# Patient Record
Sex: MALE | Race: BLACK OR AFRICAN AMERICAN | NOT HISPANIC OR LATINO
[De-identification: names, ages, dates, MRNs, and addresses within clinical notes are randomized per-mention and may not be internally consistent; named-entity substitution may affect disease eponyms.]

---

## 2020-10-30 ENCOUNTER — NON-APPOINTMENT (OUTPATIENT)
Age: 75
End: 2020-10-30

## 2020-10-30 ENCOUNTER — APPOINTMENT (OUTPATIENT)
Dept: CARDIOLOGY | Facility: CLINIC | Age: 75
End: 2020-10-30
Payer: SELF-PAY

## 2020-10-30 VITALS
TEMPERATURE: 98.2 F | BODY MASS INDEX: 20.82 KG/M2 | HEIGHT: 68 IN | OXYGEN SATURATION: 98 % | WEIGHT: 137.38 LBS | RESPIRATION RATE: 17 BRPM | SYSTOLIC BLOOD PRESSURE: 170 MMHG | HEART RATE: 72 BPM | DIASTOLIC BLOOD PRESSURE: 100 MMHG

## 2020-10-30 DIAGNOSIS — Z00.00 ENCOUNTER FOR GENERAL ADULT MEDICAL EXAMINATION W/OUT ABNORMAL FINDINGS: ICD-10-CM

## 2020-10-30 DIAGNOSIS — Z71.89 OTHER SPECIFIED COUNSELING: ICD-10-CM

## 2020-10-30 PROCEDURE — 93000 ELECTROCARDIOGRAM COMPLETE: CPT

## 2020-10-30 PROCEDURE — 99204 OFFICE O/P NEW MOD 45 MIN: CPT

## 2020-10-30 RX ORDER — ASPIRIN ENTERIC COATED TABLETS 81 MG 81 MG/1
81 TABLET, DELAYED RELEASE ORAL
Qty: 30 | Refills: 0 | Status: ACTIVE | COMMUNITY
Start: 2020-10-30 | End: 1900-01-01

## 2020-10-30 NOTE — HISTORY OF PRESENT ILLNESS
[FreeTextEntry1] : Here to establish primary care and for evaluation of symptoms.  [de-identified] : Wu is a 74yo male with PMH of HTN who presents today to establish care and for evaluation of symptoms. Patient is accompanied by his daughter Carola who states that her father sustained a puncture wound to his left lower leg  last Saturday for which they went to urgent care, and urgent care sent them to the hospital, he received seven stitches , and it was noted the patient has uncontrolled BP while at the hospital BP = 216/133, then at home Sunday 190/120, then last night at home 176/100. daughter states since the incident the patient has been noticeably fatigued, with slower speech, daughter denies loss of strength or asymmetry in face. Patient was recommended to go to the ED for evaluation but refused. Patient's daughter reports that the fatigue and slowed speech have been since Saturday when he sustained the puncture wound.

## 2020-10-30 NOTE — PHYSICAL EXAM
[No Acute Distress] : no acute distress [Well Nourished] : well nourished [Well Developed] : well developed [Well-Appearing] : well-appearing [Normal Sclera/Conjunctiva] : normal sclera/conjunctiva [PERRL] : pupils equal round and reactive to light [EOMI] : extraocular movements intact [Normal Outer Ear/Nose] : the outer ears and nose were normal in appearance [Normal Oropharynx] : the oropharynx was normal [No JVD] : no jugular venous distention [No Lymphadenopathy] : no lymphadenopathy [Supple] : supple [Thyroid Normal, No Nodules] : the thyroid was normal and there were no nodules present [No Respiratory Distress] : no respiratory distress  [No Accessory Muscle Use] : no accessory muscle use [Clear to Auscultation] : lungs were clear to auscultation bilaterally [Normal Rate] : normal rate  [Regular Rhythm] : with a regular rhythm [Normal S1, S2] : normal S1 and S2 [No Murmur] : no murmur heard [No Carotid Bruits] : no carotid bruits [No Abdominal Bruit] : a ~M bruit was not heard ~T in the abdomen [No Varicosities] : no varicosities [Pedal Pulses Present] : the pedal pulses are present [No Edema] : there was no peripheral edema [No Palpable Aorta] : no palpable aorta [No Extremity Clubbing/Cyanosis] : no extremity clubbing/cyanosis [Soft] : abdomen soft [Non Tender] : non-tender [Non-distended] : non-distended [No Masses] : no abdominal mass palpated [No HSM] : no HSM [Normal Bowel Sounds] : normal bowel sounds [Normal Posterior Cervical Nodes] : no posterior cervical lymphadenopathy [Normal Anterior Cervical Nodes] : no anterior cervical lymphadenopathy [No CVA Tenderness] : no CVA  tenderness [No Spinal Tenderness] : no spinal tenderness [No Joint Swelling] : no joint swelling [Grossly Normal Strength/Tone] : grossly normal strength/tone [No Rash] : no rash [Coordination Grossly Intact] : coordination grossly intact [No Focal Deficits] : no focal deficits [Normal Gait] : normal gait [Normal Affect] : the affect was normal [Normal Insight/Judgement] : insight and judgment were intact [de-identified] : left leg wound noted and intact  [de-identified] : alert and oriented to person /place /time

## 2020-10-31 ENCOUNTER — NON-APPOINTMENT (OUTPATIENT)
Age: 75
End: 2020-10-31

## 2020-11-30 ENCOUNTER — APPOINTMENT (OUTPATIENT)
Dept: CARDIOLOGY | Facility: CLINIC | Age: 75
End: 2020-11-30

## 2021-08-12 LAB
25(OH)D3 SERPL-MCNC: 19.8 NG/ML
ALBUMIN SERPL ELPH-MCNC: 4 G/DL
ALP BLD-CCNC: 122 U/L
ALT SERPL-CCNC: 10 U/L
ANION GAP SERPL CALC-SCNC: 13 MMOL/L
APPEARANCE: CLEAR
AST SERPL-CCNC: 25 U/L
BACTERIA UR CULT: NORMAL
BACTERIA: NEGATIVE
BASOPHILS # BLD AUTO: 0.06 K/UL
BASOPHILS NFR BLD AUTO: 0.6 %
BILIRUB SERPL-MCNC: 0.5 MG/DL
BILIRUBIN URINE: NEGATIVE
BLOOD URINE: NEGATIVE
BUN SERPL-MCNC: 8 MG/DL
CALCIUM SERPL-MCNC: 9 MG/DL
CHLORIDE SERPL-SCNC: 103 MMOL/L
CHOLEST SERPL-MCNC: 180 MG/DL
CO2 SERPL-SCNC: 23 MMOL/L
COLOR: NORMAL
CREAT SERPL-MCNC: 0.94 MG/DL
EOSINOPHIL # BLD AUTO: 0.29 K/UL
EOSINOPHIL NFR BLD AUTO: 3.1 %
ESTIMATED AVERAGE GLUCOSE: 103 MG/DL
FERRITIN SERPL-MCNC: 253 NG/ML
FOLATE SERPL-MCNC: 15.6 NG/ML
GLUCOSE QUALITATIVE U: NEGATIVE
GLUCOSE SERPL-MCNC: 88 MG/DL
HBA1C MFR BLD HPLC: 5.2 %
HCT VFR BLD CALC: 44.4 %
HDLC SERPL-MCNC: 74 MG/DL
HGB BLD-MCNC: 13.9 G/DL
HYALINE CASTS: 0 /LPF
IMM GRANULOCYTES NFR BLD AUTO: 0.3 %
IRON SATN MFR SERPL: 14 %
IRON SERPL-MCNC: 35 UG/DL
KETONES URINE: NEGATIVE
LDLC SERPL CALC-MCNC: 91 MG/DL
LEUKOCYTE ESTERASE URINE: NEGATIVE
LYMPHOCYTES # BLD AUTO: 1.59 K/UL
LYMPHOCYTES NFR BLD AUTO: 17.1 %
MAN DIFF?: NORMAL
MCHC RBC-ENTMCNC: 30.3 PG
MCHC RBC-ENTMCNC: 31.3 GM/DL
MCV RBC AUTO: 96.9 FL
MICROSCOPIC-UA: NORMAL
MONOCYTES # BLD AUTO: 1.11 K/UL
MONOCYTES NFR BLD AUTO: 11.9 %
NEUTROPHILS # BLD AUTO: 6.21 K/UL
NEUTROPHILS NFR BLD AUTO: 67 %
NITRITE URINE: NEGATIVE
NONHDLC SERPL-MCNC: 106 MG/DL
PH URINE: 7
PLATELET # BLD AUTO: 196 K/UL
POTASSIUM SERPL-SCNC: 3.6 MMOL/L
PROT SERPL-MCNC: 7.8 G/DL
PROTEIN URINE: NEGATIVE
PSA SERPL-MCNC: 24.2 NG/ML
RBC # BLD: 4.58 M/UL
RBC # FLD: 13.5 %
RED BLOOD CELLS URINE: 1 /HPF
SODIUM SERPL-SCNC: 139 MMOL/L
SPECIFIC GRAVITY URINE: 1.01
SQUAMOUS EPITHELIAL CELLS: 0 /HPF
T4 FREE SERPL-MCNC: 1.1 NG/DL
TIBC SERPL-MCNC: 252 UG/DL
TRIGL SERPL-MCNC: 76 MG/DL
TSH SERPL-ACNC: 0.63 UIU/ML
UIBC SERPL-MCNC: 217 UG/DL
UROBILINOGEN URINE: NORMAL
VIT B12 SERPL-MCNC: 201 PG/ML
WBC # FLD AUTO: 9.29 K/UL
WHITE BLOOD CELLS URINE: 0 /HPF

## 2021-08-19 ENCOUNTER — LABORATORY RESULT (OUTPATIENT)
Age: 76
End: 2021-08-19

## 2021-08-19 ENCOUNTER — NON-APPOINTMENT (OUTPATIENT)
Age: 76
End: 2021-08-19

## 2021-08-19 ENCOUNTER — APPOINTMENT (OUTPATIENT)
Dept: CARDIOLOGY | Facility: CLINIC | Age: 76
End: 2021-08-19
Payer: MEDICARE

## 2021-08-19 VITALS
HEART RATE: 84 BPM | DIASTOLIC BLOOD PRESSURE: 90 MMHG | BODY MASS INDEX: 19.31 KG/M2 | OXYGEN SATURATION: 99 % | WEIGHT: 127 LBS | SYSTOLIC BLOOD PRESSURE: 140 MMHG | TEMPERATURE: 97.5 F

## 2021-08-19 DIAGNOSIS — T14.8XXA OTHER INJURY OF UNSPECIFIED BODY REGION, INITIAL ENCOUNTER: ICD-10-CM

## 2021-08-19 PROCEDURE — 99214 OFFICE O/P EST MOD 30 MIN: CPT

## 2021-08-19 PROCEDURE — 93000 ELECTROCARDIOGRAM COMPLETE: CPT

## 2021-08-19 NOTE — HISTORY OF PRESENT ILLNESS
[FreeTextEntry1] : This is a 76 year old gentlemen with a PMH of HTN presents today for follow up after being seen in our office on October 30, 2020. Patient did not see a Neurologist and did not have neuro workup. Patient states that he is no longer having those issues of speech difficulty and repeating things. Patient is A+OX3.   Patient is accompanied by his daughter Zandra. Patient states that the wound of the skin is resolved. Patient states that he has been taking the Norvasc 5 mg daily as recommended. Patient denies dyspnea, palpitations, chest pain, nausea, vomiting, dizziness and lightheadedness.pt with increased psa did not see urology \par

## 2021-08-20 ENCOUNTER — NON-APPOINTMENT (OUTPATIENT)
Age: 76
End: 2021-08-20

## 2021-08-20 LAB — PSA SERPL-MCNC: 33.4 NG/ML

## 2021-08-21 ENCOUNTER — APPOINTMENT (OUTPATIENT)
Dept: CARDIOLOGY | Facility: CLINIC | Age: 76
End: 2021-08-21
Payer: MEDICARE

## 2021-08-21 PROCEDURE — 93306 TTE W/DOPPLER COMPLETE: CPT

## 2021-08-24 ENCOUNTER — NON-APPOINTMENT (OUTPATIENT)
Age: 76
End: 2021-08-24

## 2021-08-24 LAB
25(OH)D3 SERPL-MCNC: 16.9 NG/ML
ALBUMIN SERPL ELPH-MCNC: 4.3 G/DL
ALP BLD-CCNC: 114 U/L
ALT SERPL-CCNC: 14 U/L
ANION GAP SERPL CALC-SCNC: 10 MMOL/L
AST SERPL-CCNC: 22 U/L
BASOPHILS # BLD AUTO: 0.06 K/UL
BASOPHILS NFR BLD AUTO: 0.7 %
BILIRUB SERPL-MCNC: 0.3 MG/DL
BUN SERPL-MCNC: 11 MG/DL
CALCIUM SERPL-MCNC: 9.4 MG/DL
CHLORIDE SERPL-SCNC: 105 MMOL/L
CHOLEST SERPL-MCNC: 161 MG/DL
CO2 SERPL-SCNC: 27 MMOL/L
CREAT SERPL-MCNC: 1.01 MG/DL
EOSINOPHIL # BLD AUTO: 0.07 K/UL
EOSINOPHIL NFR BLD AUTO: 0.9 %
FERRITIN SERPL-MCNC: 198 NG/ML
FOLATE SERPL-MCNC: 15.1 NG/ML
GLUCOSE SERPL-MCNC: 83 MG/DL
HAPTOGLOB SERPL-MCNC: 155 MG/DL
HCT VFR BLD CALC: 44.9 %
HDLC SERPL-MCNC: 95 MG/DL
HGB BLD-MCNC: 14.7 G/DL
IMM GRANULOCYTES NFR BLD AUTO: 0.2 %
IRON SERPL-MCNC: 101 UG/DL
LDH SERPL-CCNC: 103 U/L
LDLC SERPL CALC-MCNC: 56 MG/DL
LYMPHOCYTES # BLD AUTO: 1.48 K/UL
LYMPHOCYTES NFR BLD AUTO: 18.3 %
MAGNESIUM SERPL-MCNC: 2.2 MG/DL
MAN DIFF?: NORMAL
MCHC RBC-ENTMCNC: 30.9 PG
MCHC RBC-ENTMCNC: 32.7 GM/DL
MCV RBC AUTO: 94.5 FL
MONOCYTES # BLD AUTO: 0.74 K/UL
MONOCYTES NFR BLD AUTO: 9.2 %
NEUTROPHILS # BLD AUTO: 5.7 K/UL
NEUTROPHILS NFR BLD AUTO: 70.7 %
NONHDLC SERPL-MCNC: 66 MG/DL
PHOSPHATE SERPL-MCNC: 2.9 MG/DL
PLATELET # BLD AUTO: 214 K/UL
POTASSIUM SERPL-SCNC: 4.8 MMOL/L
PROT SERPL-MCNC: 7.1 G/DL
RBC # BLD: 4.75 M/UL
RBC # FLD: 14 %
SODIUM SERPL-SCNC: 142 MMOL/L
T3RU NFR SERPL: 1 TBI
T4 FREE SERPL-MCNC: 1.2 NG/DL
T4 SERPL-MCNC: 5.4 UG/DL
TRANSFERRIN SERPL-MCNC: 206 MG/DL
TRIGL SERPL-MCNC: 50 MG/DL
TSH SERPL-ACNC: 0.29 UIU/ML
URATE SERPL-MCNC: 6.8 MG/DL
VIT B12 SERPL-MCNC: 188 PG/ML
WBC # FLD AUTO: 8.07 K/UL

## 2021-09-03 ENCOUNTER — APPOINTMENT (OUTPATIENT)
Dept: UROLOGY | Facility: CLINIC | Age: 76
End: 2021-09-03
Payer: MEDICARE

## 2021-09-03 VITALS
HEART RATE: 69 BPM | BODY MASS INDEX: 19.25 KG/M2 | TEMPERATURE: 97.8 F | HEIGHT: 68 IN | SYSTOLIC BLOOD PRESSURE: 143 MMHG | DIASTOLIC BLOOD PRESSURE: 83 MMHG | RESPIRATION RATE: 15 BRPM | OXYGEN SATURATION: 95 % | WEIGHT: 127 LBS

## 2021-09-03 DIAGNOSIS — Z12.5 ENCOUNTER FOR SCREENING FOR MALIGNANT NEOPLASM OF PROSTATE: ICD-10-CM

## 2021-09-03 PROCEDURE — 99203 OFFICE O/P NEW LOW 30 MIN: CPT

## 2021-09-04 PROBLEM — Z12.5 SCREENING FOR PROSTATE CANCER: Status: ACTIVE | Noted: 2020-10-30

## 2021-09-04 NOTE — HISTORY OF PRESENT ILLNESS
[FreeTextEntry1] : He is a 76-year-old man who is seen today for initial visit with his daughter.  Generally nocturia is 1 or 2 times.  He is not significantly bothered by urinary symptoms.  There is no weight loss or bone pain.  There is no hematuria or dysuria.  He is not aware of any family history of prostate cancer.  In October 2020, PSA level was 24 and in August 2021 it was 33.  Urinalysis was normal.  Residual urine volume today is about 50 cc.

## 2021-09-04 NOTE — PHYSICAL EXAM
[General Appearance - Well Developed] : well developed [Normal Appearance] : normal appearance [General Appearance - Well Nourished] : well nourished [Well Groomed] : well groomed [General Appearance - In No Acute Distress] : no acute distress [Edema] : no peripheral edema [Respiration, Rhythm And Depth] : normal respiratory rhythm and effort [Exaggerated Use Of Accessory Muscles For Inspiration] : no accessory muscle use [Abdomen Soft] : soft [Abdomen Tenderness] : non-tender [Costovertebral Angle Tenderness] : no ~M costovertebral angle tenderness [Urethral Meatus] : meatus normal [Penis Abnormality] : normal uncircumcised penis [Urinary Bladder Findings] : the bladder was normal on palpation [Testes Mass (___cm)] : there were no testicular masses [Scrotum] : the scrotum was normal [Prostate Tenderness] : the prostate was not tender [FreeTextEntry1] : Penile vitiligo, nodularity of the prostate at the base, prostate moderately enlarged. [Normal Station and Gait] : the gait and station were normal for the patient's age [] : no rash [No Focal Deficits] : no focal deficits [Oriented To Time, Place, And Person] : oriented to person, place, and time [Affect] : the affect was normal [Mood] : the mood was normal [Not Anxious] : not anxious [Inguinal Lymph Nodes Enlarged Bilaterally] : inguinal

## 2021-09-04 NOTE — ASSESSMENT
[FreeTextEntry1] : He has a very high PSA level and abnormal prostate examination.  We had a long discussion regarding his PSA level. We discussed different PSA parameters such as PSA velocity, free PSA and age-adjusted PSA level. Other markers such as 4K score, PCA3, Prostate health Index, etc were reviewed.  We also discussed observation with repeat PSA level, prostate or pelvic MRI and prostate biopsy in detail. Risks and benefits of each option were discussed and questions were answered. Patient was made aware that prostate cancer can exist at any PSA level.\par Prostate biopsy was reviewed in detail. We discussed how the procedure is performed. Preparation with oral antibiotics and Fleet enema was reviewed. Trans-rectal and trans-perineal biopsies were discussed. Risks of biopsy especially bacteremia, UTI, sepsis, bleeding, erectile dysfunction, etc were discussed. He was made aware that biopsy may not be able to diagnose prostate cancer. Questions were answered.  Pending insurance approval, MRI of the prostate will be ordered and we will discuss the next step most likely scheduling prostate fusion biopsy, on the phone.\par \par Omer Holland MD, FACS\par [facility]\par  of Urology\par \par [address], [address]\[place] [address]\par \par [address], [address]\[place] [address]\par \par Tel: [phone]\par Fax: [phone]

## 2021-09-17 ENCOUNTER — APPOINTMENT (OUTPATIENT)
Dept: MRI IMAGING | Facility: CLINIC | Age: 76
End: 2021-09-17
Payer: MEDICARE

## 2021-09-17 ENCOUNTER — OUTPATIENT (OUTPATIENT)
Dept: OUTPATIENT SERVICES | Facility: HOSPITAL | Age: 76
LOS: 1 days | End: 2021-09-17
Payer: COMMERCIAL

## 2021-09-17 DIAGNOSIS — R47.9 UNSPECIFIED SPEECH DISTURBANCES: ICD-10-CM

## 2021-09-17 PROCEDURE — 70551 MRI BRAIN STEM W/O DYE: CPT

## 2021-09-17 PROCEDURE — 70551 MRI BRAIN STEM W/O DYE: CPT | Mod: 26

## 2021-09-20 ENCOUNTER — APPOINTMENT (OUTPATIENT)
Dept: MRI IMAGING | Facility: CLINIC | Age: 76
End: 2021-09-20
Payer: MEDICARE

## 2021-09-20 ENCOUNTER — RESULT REVIEW (OUTPATIENT)
Age: 76
End: 2021-09-20

## 2021-09-20 ENCOUNTER — OUTPATIENT (OUTPATIENT)
Dept: OUTPATIENT SERVICES | Facility: HOSPITAL | Age: 76
LOS: 1 days | End: 2021-09-20
Payer: COMMERCIAL

## 2021-09-20 DIAGNOSIS — Z00.8 ENCOUNTER FOR OTHER GENERAL EXAMINATION: ICD-10-CM

## 2021-09-20 PROCEDURE — 76377 3D RENDER W/INTRP POSTPROCES: CPT

## 2021-09-20 PROCEDURE — 76377 3D RENDER W/INTRP POSTPROCES: CPT | Mod: 26

## 2021-09-20 PROCEDURE — A9585: CPT

## 2021-09-20 PROCEDURE — 72197 MRI PELVIS W/O & W/DYE: CPT

## 2021-09-20 PROCEDURE — 72197 MRI PELVIS W/O & W/DYE: CPT | Mod: 26

## 2021-09-24 ENCOUNTER — APPOINTMENT (OUTPATIENT)
Dept: CARDIOLOGY | Facility: CLINIC | Age: 76
End: 2021-09-24
Payer: MEDICARE

## 2021-09-24 ENCOUNTER — LABORATORY RESULT (OUTPATIENT)
Age: 76
End: 2021-09-24

## 2021-09-24 VITALS
SYSTOLIC BLOOD PRESSURE: 136 MMHG | HEIGHT: 68 IN | HEART RATE: 54 BPM | TEMPERATURE: 97.7 F | DIASTOLIC BLOOD PRESSURE: 82 MMHG | OXYGEN SATURATION: 99 % | BODY MASS INDEX: 19.55 KG/M2 | WEIGHT: 129 LBS

## 2021-09-24 PROCEDURE — 99214 OFFICE O/P EST MOD 30 MIN: CPT

## 2021-09-24 NOTE — HISTORY OF PRESENT ILLNESS
[FreeTextEntry1] : This is a 76 year old gentlemen with a PMH of HTN presents today for follow up. Previously seen in office 1 month ago, BP noted to be elevate din office and patient was started on Losartan 50mg daily. Patient also with speech difficulty and ordered for MRI Brain, referred to see Neurology. Patient was seen by Neurology on 9/13/21 (Dr. Nissenbaum) and completed studies, to return next week for additional studies and f/u 10/13/21. Patient has appt to see GI 10/8/21 for colon cancer screening consultation . No additional interim events.  Patient states that he is feeling well today and has no new issues or concerns.

## 2021-09-26 ENCOUNTER — NON-APPOINTMENT (OUTPATIENT)
Age: 76
End: 2021-09-26

## 2021-10-10 LAB
25(OH)D3 SERPL-MCNC: 24.2 NG/ML
APPEARANCE: CLEAR
BACTERIA UR CULT: NORMAL
BACTERIA: NEGATIVE
BILIRUBIN URINE: NEGATIVE
BLOOD URINE: NEGATIVE
COLOR: NORMAL
GLUCOSE QUALITATIVE U: NEGATIVE
HYALINE CASTS: 0 /LPF
IF BLOCK AB SER QL: 1 AU/ML
KETONES URINE: NEGATIVE
LEUKOCYTE ESTERASE URINE: NEGATIVE
METHYLMALONATE SERPL-SCNC: 245 NMOL/L
MICROSCOPIC-UA: NORMAL
NITRITE URINE: NEGATIVE
PCA AB SER QL IF: NORMAL
PH URINE: 7
PROTEIN URINE: NEGATIVE
RED BLOOD CELLS URINE: 1 /HPF
SPECIFIC GRAVITY URINE: 1.01
SQUAMOUS EPITHELIAL CELLS: 0 /HPF
UROBILINOGEN URINE: NORMAL
VIT B12 SERPL-MCNC: 253 PG/ML
WHITE BLOOD CELLS URINE: 0 /HPF

## 2021-10-19 ENCOUNTER — OUTPATIENT (OUTPATIENT)
Dept: OUTPATIENT SERVICES | Facility: HOSPITAL | Age: 76
LOS: 1 days | End: 2021-10-19
Payer: MEDICARE

## 2021-10-19 VITALS
TEMPERATURE: 98 F | SYSTOLIC BLOOD PRESSURE: 147 MMHG | OXYGEN SATURATION: 98 % | HEART RATE: 75 BPM | WEIGHT: 119.93 LBS | RESPIRATION RATE: 14 BRPM | DIASTOLIC BLOOD PRESSURE: 84 MMHG | HEIGHT: 67 IN

## 2021-10-19 DIAGNOSIS — R97.20 ELEVATED PROSTATE SPECIFIC ANTIGEN [PSA]: ICD-10-CM

## 2021-10-19 DIAGNOSIS — I10 ESSENTIAL (PRIMARY) HYPERTENSION: ICD-10-CM

## 2021-10-19 DIAGNOSIS — Z91.89 OTHER SPECIFIED PERSONAL RISK FACTORS, NOT ELSEWHERE CLASSIFIED: ICD-10-CM

## 2021-10-19 LAB
ANION GAP SERPL CALC-SCNC: 8 MMOL/L — SIGNIFICANT CHANGE UP (ref 7–14)
BUN SERPL-MCNC: 16 MG/DL — SIGNIFICANT CHANGE UP (ref 7–23)
CALCIUM SERPL-MCNC: 8.9 MG/DL — SIGNIFICANT CHANGE UP (ref 8.4–10.5)
CHLORIDE SERPL-SCNC: 103 MMOL/L — SIGNIFICANT CHANGE UP (ref 98–107)
CO2 SERPL-SCNC: 26 MMOL/L — SIGNIFICANT CHANGE UP (ref 22–31)
CREAT SERPL-MCNC: 1.19 MG/DL — SIGNIFICANT CHANGE UP (ref 0.5–1.3)
GLUCOSE SERPL-MCNC: 98 MG/DL — SIGNIFICANT CHANGE UP (ref 70–99)
HCT VFR BLD CALC: 34 % — LOW (ref 39–50)
HGB BLD-MCNC: 11.1 G/DL — LOW (ref 13–17)
MCHC RBC-ENTMCNC: 30.7 PG — SIGNIFICANT CHANGE UP (ref 27–34)
MCHC RBC-ENTMCNC: 32.6 GM/DL — SIGNIFICANT CHANGE UP (ref 32–36)
MCV RBC AUTO: 93.9 FL — SIGNIFICANT CHANGE UP (ref 80–100)
NRBC # BLD: 0 /100 WBCS — SIGNIFICANT CHANGE UP
NRBC # FLD: 0 K/UL — SIGNIFICANT CHANGE UP
PLATELET # BLD AUTO: 244 K/UL — SIGNIFICANT CHANGE UP (ref 150–400)
POTASSIUM SERPL-MCNC: 4.4 MMOL/L — SIGNIFICANT CHANGE UP (ref 3.5–5.3)
POTASSIUM SERPL-SCNC: 4.4 MMOL/L — SIGNIFICANT CHANGE UP (ref 3.5–5.3)
RBC # BLD: 3.62 M/UL — LOW (ref 4.2–5.8)
RBC # FLD: 13.2 % — SIGNIFICANT CHANGE UP (ref 10.3–14.5)
SODIUM SERPL-SCNC: 137 MMOL/L — SIGNIFICANT CHANGE UP (ref 135–145)
WBC # BLD: 7.19 K/UL — SIGNIFICANT CHANGE UP (ref 3.8–10.5)
WBC # FLD AUTO: 7.19 K/UL — SIGNIFICANT CHANGE UP (ref 3.8–10.5)

## 2021-10-19 PROCEDURE — 93010 ELECTROCARDIOGRAM REPORT: CPT

## 2021-10-19 RX ORDER — PREGABALIN 225 MG/1
1 CAPSULE ORAL
Qty: 0 | Refills: 0 | DISCHARGE

## 2021-10-19 NOTE — H&P PST ADULT - NEGATIVE NEUROLOGICAL SYMPTOMS
no weakness/no paresthesias/no generalized seizures/no vertigo/no loss of sensation/no difficulty walking/no hemiparesis

## 2021-10-19 NOTE — H&P PST ADULT - NEUROLOGICAL COMMENTS
occasional confusion, hx of abnormal MRI noting "old possible infarct " areas, denies unilateral weakness occasional confusion, hx of abnormal MRI noting "old possible infarct " areas, denies unilateral weakness, TIA, CVA

## 2021-10-19 NOTE — H&P PST ADULT - ANESTHESIA, PREVIOUS REACTION, PROFILE
another daughter has delayed wakening Denies family history of malignant hyperthermia/unknown other daughter has delayed wakening Denies family history of malignant hyperthermia/unknown

## 2021-10-19 NOTE — H&P PST ADULT - PROBLEM SELECTOR PLAN 1
Assessment and Plan: Patient scheduled for surgery on 11/2/21  Patient provided with verbal and written presurgical instructions; verbalized understanding  with teach back.    Patient provided with famotidine for GI prophylaxis; verbalized understanding.    Patient instructed to call surgeon to schedule COVID appointment     Medical evaluation requested by PST for advanced age, poor historian , patient verbalized understanding, will obtain    Patient instructed to stop aspirin on 10/26/21

## 2021-10-19 NOTE — H&P PST ADULT - NSICDXPASTMEDICALHX_GEN_ALL_CORE_FT
PAST MEDICAL HISTORY:  Elevated prostate specific antigen (PSA)     History of short term memory loss sporadic    HLD (hyperlipidemia)     HTN (hypertension)     Poor historian

## 2021-10-19 NOTE — H&P PST ADULT - PROBLEM SELECTOR PLAN 2
Assessment and Plan: Patient instructed to take losartan, amlodipine  on day of procedure, verbalized understanding.

## 2021-10-19 NOTE — H&P PST ADULT - HISTORY OF PRESENT ILLNESS
76 yr old male, poor historian accompanied by daughter, presents with preop dx elevated PSA scheduled for transperineal fusion prostate biopsy, as per daughter PCP noted elevated PSA, patient denies hematuria, dysuria or changes, scheduled for transperineal fusion prostate biopsy

## 2021-10-19 NOTE — H&P PST ADULT - NSANTHOSAYNRD_GEN_A_CORE
No. PATRICIO screening performed.  STOP BANG Legend: 0-2 = LOW Risk; 3-4 = INTERMEDIATE Risk; 5-8 = HIGH Risk

## 2021-10-26 PROBLEM — E78.5 HYPERLIPIDEMIA, UNSPECIFIED: Chronic | Status: ACTIVE | Noted: 2021-10-19

## 2021-10-26 PROBLEM — Z87.898 PERSONAL HISTORY OF OTHER SPECIFIED CONDITIONS: Chronic | Status: ACTIVE | Noted: 2021-10-19

## 2021-10-26 PROBLEM — R97.20 ELEVATED PROSTATE SPECIFIC ANTIGEN [PSA]: Chronic | Status: ACTIVE | Noted: 2021-10-19

## 2021-10-26 PROBLEM — I10 ESSENTIAL (PRIMARY) HYPERTENSION: Chronic | Status: ACTIVE | Noted: 2021-10-19

## 2021-10-30 ENCOUNTER — APPOINTMENT (OUTPATIENT)
Dept: DISASTER EMERGENCY | Facility: CLINIC | Age: 76
End: 2021-10-30

## 2021-10-31 LAB — SARS-COV-2 N GENE NPH QL NAA+PROBE: NOT DETECTED

## 2021-11-01 ENCOUNTER — TRANSCRIPTION ENCOUNTER (OUTPATIENT)
Age: 76
End: 2021-11-01

## 2021-11-01 ENCOUNTER — APPOINTMENT (OUTPATIENT)
Dept: CARDIOLOGY | Facility: CLINIC | Age: 76
End: 2021-11-01
Payer: MEDICARE

## 2021-11-01 VITALS
HEIGHT: 68 IN | WEIGHT: 129 LBS | TEMPERATURE: 98.3 F | BODY MASS INDEX: 19.55 KG/M2 | SYSTOLIC BLOOD PRESSURE: 126 MMHG | DIASTOLIC BLOOD PRESSURE: 72 MMHG

## 2021-11-01 VITALS
TEMPERATURE: 97 F | HEART RATE: 78 BPM | OXYGEN SATURATION: 98 % | WEIGHT: 119.93 LBS | RESPIRATION RATE: 14 BRPM | SYSTOLIC BLOOD PRESSURE: 180 MMHG | HEIGHT: 67 IN | DIASTOLIC BLOOD PRESSURE: 81 MMHG

## 2021-11-01 DIAGNOSIS — R47.9 UNSPECIFIED SPEECH DISTURBANCES: ICD-10-CM

## 2021-11-01 DIAGNOSIS — Z01.818 ENCOUNTER FOR OTHER PREPROCEDURAL EXAMINATION: ICD-10-CM

## 2021-11-01 DIAGNOSIS — R90.89 OTHER ABNORMAL FINDINGS ON DIAGNOSTIC IMAGING OF CENTRAL NERVOUS SYSTEM: ICD-10-CM

## 2021-11-01 PROCEDURE — 99213 OFFICE O/P EST LOW 20 MIN: CPT

## 2021-11-01 NOTE — HISTORY OF PRESENT ILLNESS
[Preoperative Visit] : for a medical evaluation prior to surgery [Scheduled Procedure ___] : a [unfilled] [Date of Surgery ___] : on [unfilled] [Surgeon Name ___] : surgeon: [unfilled] [FreeTextEntry1] : This is a 76 year old gentlemen with a PMH of HTN presents today for medical clearance. He is scheduled for prostate biopsy on 11/2/21 with Dr. Holland. patient doing well with no additional issues or concerns for today. Patient denies chest pain, shortness of breath, palpitations, dizziness, vision changes, n/v, abdominal pain, changes in bowel/bladder habits,  or appetite.

## 2021-11-01 NOTE — PHYSICAL EXAM
[General Appearance - Well Developed] : well developed [Normal Appearance] : normal appearance [Well Groomed] : well groomed [General Appearance - Well Nourished] : well nourished [No Deformities] : no deformities [General Appearance - In No Acute Distress] : no acute distress [Normal Conjunctiva] : the conjunctiva exhibited no abnormalities [Eyelids - No Xanthelasma] : the eyelids demonstrated no xanthelasmas [Normal Oral Mucosa] : normal oral mucosa [No Oral Pallor] : no oral pallor [No Oral Cyanosis] : no oral cyanosis [Normal Jugular Venous A Waves Present] : normal jugular venous A waves present [Normal Jugular Venous V Waves Present] : normal jugular venous V waves present [No Jugular Venous Mathias A Waves] : no jugular venous mathias A waves [Respiration, Rhythm And Depth] : normal respiratory rhythm and effort [Exaggerated Use Of Accessory Muscles For Inspiration] : no accessory muscle use [Auscultation Breath Sounds / Voice Sounds] : lungs were clear to auscultation bilaterally [Heart Rate And Rhythm] : heart rate and rhythm were normal [Heart Sounds] : normal S1 and S2 [Murmurs] : no murmurs present [Abdomen Soft] : soft [Abdomen Tenderness] : non-tender [Abdomen Mass (___ Cm)] : no abdominal mass palpated [Abnormal Walk] : normal gait [Gait - Sufficient For Exercise Testing] : the gait was sufficient for exercise testing [Nail Clubbing] : no clubbing of the fingernails [Cyanosis, Localized] : no localized cyanosis [Petechial Hemorrhages (___cm)] : no petechial hemorrhages [Skin Color & Pigmentation] : normal skin color and pigmentation [] : no rash [No Venous Stasis] : no venous stasis [Skin Lesions] : no skin lesions [No Skin Ulcers] : no skin ulcer [No Xanthoma] : no  xanthoma was observed [Oriented To Time, Place, And Person] : oriented to person, place, and time [Affect] : the affect was normal [Mood] : the mood was normal [No Anxiety] : not feeling anxious

## 2021-11-02 ENCOUNTER — APPOINTMENT (OUTPATIENT)
Dept: UROLOGY | Facility: AMBULATORY SURGERY CENTER | Age: 76
End: 2021-11-02

## 2021-11-02 ENCOUNTER — RESULT REVIEW (OUTPATIENT)
Age: 76
End: 2021-11-02

## 2021-11-02 ENCOUNTER — OUTPATIENT (OUTPATIENT)
Dept: OUTPATIENT SERVICES | Facility: HOSPITAL | Age: 76
LOS: 1 days | Discharge: HOME | End: 2021-11-02
Payer: MEDICARE

## 2021-11-02 VITALS
SYSTOLIC BLOOD PRESSURE: 184 MMHG | DIASTOLIC BLOOD PRESSURE: 95 MMHG | TEMPERATURE: 98 F | HEART RATE: 85 BPM | RESPIRATION RATE: 15 BRPM | OXYGEN SATURATION: 100 %

## 2021-11-02 DIAGNOSIS — R97.20 ELEVATED PROSTATE SPECIFIC ANTIGEN [PSA]: ICD-10-CM

## 2021-11-02 PROCEDURE — 76377 3D RENDER W/INTRP POSTPROCES: CPT | Mod: 26

## 2021-11-02 PROCEDURE — 88305 TISSUE EXAM BY PATHOLOGIST: CPT | Mod: 26

## 2021-11-02 PROCEDURE — 76942 ECHO GUIDE FOR BIOPSY: CPT | Mod: 26,59

## 2021-11-02 PROCEDURE — 76872 US TRANSRECTAL: CPT | Mod: 26

## 2021-11-02 PROCEDURE — 55706 BX PRST8 NDL SAT SAMPLING: CPT

## 2021-11-02 NOTE — ASU DISCHARGE PLAN (ADULT/PEDIATRIC) - ASU DC SPECIAL INSTRUCTIONSFT
It is common to have blood in the urine after your procedure.  There also may be some blood in ejaculate for the next 3-4 weeks. This is normal.     -You may resume your regular diet and regular medication regimen.    -Provided that you are not restricted with fluids by your physician, you should drink 6-8 (8 oz.) glasses of fluid per day.  -You may shower or bathe.    -Provided you are not restricted by your physician, you may take Tylenol and Ibuprofen, available over the counter, for pain. You may take either one every 6 hours, you may alternate these medications so that you take one every 3 hours (e.g., Tylenol at 12pm, Ibuprofen at 3pm, Tylenol at 6pm, Ibuprofen at 9pm, etc...). Follow the maximum doses and administration instructions on the bottles. Do not exceed 4 grams of Tylenol daily. If your pain is not controlled with over the counter pain medications, please call your urologist.  -Do not perform strenuous activities or resume sexual activity for one day.   You may climb stairs.  -Call your physician if you have a fever over 101F.  -Call your urologist during normal business hours with any other routine questions.  -Dr. Holland will call with biopsy results and to discuss follow up

## 2021-11-02 NOTE — BRIEF OPERATIVE NOTE - OPERATION/FINDINGS
Patient did not empty bowels prior to procedure making it very difficult for accurate view. Gentamicin given in OR. Able to get all three targeted biopsies as well as 12 core biopsies as well.

## 2021-11-02 NOTE — ASU DISCHARGE PLAN (ADULT/PEDIATRIC) - CALL YOUR DOCTOR IF YOU HAVE ANY OF THE FOLLOWING:
Bleeding that does not stop/Nausea and vomiting that does not stop/Unable to urinate Bleeding that does not stop/Pain not relieved by Medications/Fever greater than (need to indicate Fahrenheit or Celsius)/Nausea and vomiting that does not stop/Unable to urinate

## 2021-11-02 NOTE — ASU DISCHARGE PLAN (ADULT/PEDIATRIC) - CARE PROVIDER_API CALL
Omer Holland)  Urology  [address], [place] [address]  Phone: [phone]  Fax: [phone]  Follow Up Time:

## 2021-11-02 NOTE — BRIEF OPERATIVE NOTE - NSICDXBRIEFPROCEDURE_GEN_ALL_CORE_FT
PROCEDURES:  Needle biopsy, prostate, transperineal, with stereotactic template-guided saturation sampling including imaging guidance 02-Nov-2021 17:20:10  Donato Mohr

## 2021-11-05 LAB — SURGICAL PATHOLOGY STUDY: SIGNIFICANT CHANGE UP

## 2021-11-11 ENCOUNTER — APPOINTMENT (OUTPATIENT)
Dept: UROLOGY | Facility: CLINIC | Age: 76
End: 2021-11-11
Payer: MEDICARE

## 2021-11-11 VITALS
DIASTOLIC BLOOD PRESSURE: 82 MMHG | SYSTOLIC BLOOD PRESSURE: 136 MMHG | BODY MASS INDEX: 19.55 KG/M2 | RESPIRATION RATE: 14 BRPM | HEIGHT: 68 IN | TEMPERATURE: 97.3 F | WEIGHT: 129 LBS | HEART RATE: 76 BPM | OXYGEN SATURATION: 98 %

## 2021-11-11 PROCEDURE — 99214 OFFICE O/P EST MOD 30 MIN: CPT | Mod: 24

## 2021-11-11 NOTE — HISTORY OF PRESENT ILLNESS
[FreeTextEntry1] : He is a 76-year-old man who is seen today with his daughter.  He underwent MRI of the prostate in September 2021 for elevated PSA level of 33.  MRI showed 32 g prostate and three suspicious lesions PI-RADS 4.  There was no obvious extra prostatic or seminal vesicle invasion or pelvic adenopathy.  Fusion biopsy of the prostate in November 2021 showed high-volume [place] 8 and Eden 7 prostate cancer from target and random prostate biopsy in most biopsied areas.  He is here today to discuss the next step.\par Previous notes: Generally nocturia is 1 or 2 times.  He is not significantly bothered by urinary symptoms.  There is no weight loss or bone pain.  There is no hematuria or dysuria.  He is not aware of any family history of prostate cancer.  In October 2020, PSA level was 24 and in August 2021 it was 33.  Urinalysis was normal.  Residual urine volume is about 50 cc.

## 2021-11-11 NOTE — ASSESSMENT
[FreeTextEntry1] : We discussed prostate cancer grading and staging system. Risk categories were discussed. Treatment options discussed included active surveillance, surgery, brachytherapy, external beam radiation therapy, Cyberknife, hormonal therapy, cryotherapy, chemotherapy, etc. Radical retropubic versus laparoscopic or robotic approaches were discussed. Postoperative care was reviewed. Risks of surgery discussed included but were not limited to bleeding, infection, injury to surrounding organs, DVT, PE, transfusion, etc. Risks of stress incontinence, bladder neck stricture, penile shortening and ED discussed. Possibility of positive margins, recurrence of prostate cancer and the need for additional treatment options such as radiation or hormonal therapy was reviewed. In case of active surveillance, follow-up schedule with PSA levels, repeat biopsy and possibly pelvic MRI was reviewed.\par Different types of radiation treatment as indicated above were discussed. Effects of radiation on the surrounding organs, urinary symptoms, ED, secondary malignancies, etc. reviewed as well. Depending on pathology report, further staging can be performed with CT and bone scans. Patient's questions were answered. If he decides to pursue treatment with our institution, he will followup shortly after deciding on which treatment option to proceed with. Obtaining a second opinion was highly recommended.  Pending insurance approval, he will undergo CT and bone scans for metastatic work-up.  When he returns to the office he will start androgen deprivation therapy and then depending on the results of metastatic work-up we will discuss referral to radiation or medical oncology.\par \par Omer Holland MD, FACS\par [facility]\par  of Urology\par \par [address], [address]\[place] [address]\par \par [address], [address]\[place] [address]\par \par Tel: [phone]\par Fax: [phone]

## 2021-11-11 NOTE — PHYSICAL EXAM
[General Appearance - Well Developed] : well developed [General Appearance - Well Nourished] : well nourished [Normal Appearance] : normal appearance [Well Groomed] : well groomed [General Appearance - In No Acute Distress] : no acute distress [Abdomen Soft] : soft [Abdomen Tenderness] : non-tender [Costovertebral Angle Tenderness] : no ~M costovertebral angle tenderness [Urethral Meatus] : meatus normal [Penis Abnormality] : normal uncircumcised penis [Urinary Bladder Findings] : the bladder was normal on palpation [Scrotum] : the scrotum was normal [Testes Mass (___cm)] : there were no testicular masses [Prostate Tenderness] : the prostate was not tender [FreeTextEntry1] : Penile vitiligo, nodular prostate.  [] : no respiratory distress [Respiration, Rhythm And Depth] : normal respiratory rhythm and effort [Exaggerated Use Of Accessory Muscles For Inspiration] : no accessory muscle use [Inguinal Lymph Nodes Enlarged Bilaterally] : inguinal

## 2021-11-22 ENCOUNTER — APPOINTMENT (OUTPATIENT)
Dept: MRI IMAGING | Facility: CLINIC | Age: 76
End: 2021-11-22
Payer: MEDICARE

## 2021-11-22 PROCEDURE — A9585: CPT | Mod: JW

## 2021-11-22 PROCEDURE — 74183 MRI ABD W/O CNTR FLWD CNTR: CPT | Mod: MG

## 2021-11-22 PROCEDURE — G1004: CPT

## 2021-11-24 ENCOUNTER — OUTPATIENT (OUTPATIENT)
Dept: OUTPATIENT SERVICES | Facility: HOSPITAL | Age: 76
LOS: 1 days | End: 2021-11-24

## 2021-11-24 ENCOUNTER — APPOINTMENT (OUTPATIENT)
Dept: NUCLEAR MEDICINE | Facility: CLINIC | Age: 76
End: 2021-11-24
Payer: MEDICARE

## 2021-11-24 ENCOUNTER — APPOINTMENT (OUTPATIENT)
Dept: CARDIOLOGY | Facility: CLINIC | Age: 76
End: 2021-11-24

## 2021-11-24 DIAGNOSIS — C61 MALIGNANT NEOPLASM OF PROSTATE: ICD-10-CM

## 2021-11-24 PROCEDURE — G1004: CPT

## 2021-11-24 PROCEDURE — 78306 BONE IMAGING WHOLE BODY: CPT | Mod: 26,ME

## 2021-11-27 ENCOUNTER — RX RENEWAL (OUTPATIENT)
Age: 76
End: 2021-11-27

## 2021-11-29 ENCOUNTER — APPOINTMENT (OUTPATIENT)
Dept: UROLOGY | Facility: CLINIC | Age: 76
End: 2021-11-29
Payer: MEDICARE

## 2021-11-29 VITALS
BODY MASS INDEX: 19.55 KG/M2 | HEART RATE: 78 BPM | OXYGEN SATURATION: 96 % | RESPIRATION RATE: 14 BRPM | DIASTOLIC BLOOD PRESSURE: 89 MMHG | TEMPERATURE: 97.5 F | WEIGHT: 129 LBS | HEIGHT: 68 IN | SYSTOLIC BLOOD PRESSURE: 156 MMHG

## 2021-11-29 PROCEDURE — 99214 OFFICE O/P EST MOD 30 MIN: CPT | Mod: 25

## 2021-11-29 PROCEDURE — 96402 CHEMO HORMON ANTINEOPL SQ/IM: CPT

## 2021-11-29 RX ORDER — LEUPROLIDE ACETATE 30 MG/.5ML
30 INJECTION, SUSPENSION, EXTENDED RELEASE SUBCUTANEOUS
Refills: 0 | Status: COMPLETED | OUTPATIENT
Start: 2021-11-29

## 2021-11-29 NOTE — PHYSICAL EXAM
[General Appearance - Well Developed] : well developed [General Appearance - Well Nourished] : well nourished [Normal Appearance] : normal appearance [Well Groomed] : well groomed [General Appearance - In No Acute Distress] : no acute distress [Abdomen Soft] : soft [Abdomen Tenderness] : non-tender [Costovertebral Angle Tenderness] : no ~M costovertebral angle tenderness [Urethral Meatus] : meatus normal [Penis Abnormality] : normal uncircumcised penis [Urinary Bladder Findings] : the bladder was normal on palpation [Scrotum] : the scrotum was normal [Testes Mass (___cm)] : there were no testicular masses [Prostate Tenderness] : the prostate was not tender [FreeTextEntry1] : Penile vitiligo, nodular prostate.  Prostate examination was done previously. [] : no respiratory distress [Respiration, Rhythm And Depth] : normal respiratory rhythm and effort [Exaggerated Use Of Accessory Muscles For Inspiration] : no accessory muscle use [Normal Station and Gait] : the gait and station were normal for the patient's age

## 2021-11-29 NOTE — HISTORY OF PRESENT ILLNESS
[FreeTextEntry1] : He is a 76-year-old man who is seen today with family for treatment of prostate cancer.  Bone scan did not show any evidence of metastasis and MRI of the abdomen did not show any evidence of metastasis either.  He is starting androgen deprivation therapy today.  He has started vitamin D and calcium.  PSA level was 33.\par \par MRI showed 32 g prostate and three suspicious lesions PI-RADS 4.  There was no obvious extra prostatic or seminal vesicle invasion or pelvic adenopathy.  Fusion biopsy of the prostate in November 2021 showed high-volume Eden 8 and Eden 7 prostate cancer from target and random prostate biopsy in most biopsied areas.  \par \par Previous notes: Generally nocturia is 1 or 2 times.  He is not significantly bothered by urinary symptoms.  There is no weight loss or bone pain.  There is no hematuria or dysuria.  He is not aware of any family history of prostate cancer.  In October 2020, PSA level was 24 and in August 2021 it was 33.  Urinalysis was normal.  Residual urine volume is about 50 cc.

## 2021-11-29 NOTE — ASSESSMENT
[FreeTextEntry1] : Copy of the reports were given to the patient.  Side effects of androgen deprivation therapy were discussed in detail.  Eligard 30 mg injection was given on the left side.  He is already on vitamin D and calcium.  Since metastatic work-up did not show any evidence of obvious metastasis, he will see radiation oncology to see if he is a candidate for radiation treatment.  Patient and family were made aware that it is possible that micrometastasis may not be picked up by metastatic work-up at this time.  They will follow up in 4 months for the next Eligard injection.\par \par Omer Holland MD, FACS\par [facility]\par  of Urology\par \par [address], [address]\[place] [address]\par \par [address], [address]\[place] [address]\par \par Tel: [phone]\par Fax: [phone]

## 2021-12-16 ENCOUNTER — NON-APPOINTMENT (OUTPATIENT)
Age: 76
End: 2021-12-16

## 2021-12-16 LAB
25(OH)D3 SERPL-MCNC: 49 NG/ML
BASOPHILS # BLD AUTO: 0.07 K/UL
BASOPHILS NFR BLD AUTO: 0.9 %
EOSINOPHIL # BLD AUTO: 0.25 K/UL
EOSINOPHIL NFR BLD AUTO: 3.1 %
HCT VFR BLD CALC: 43.2 %
HGB BLD-MCNC: 13.1 G/DL
IMM GRANULOCYTES NFR BLD AUTO: 0.4 %
LYMPHOCYTES # BLD AUTO: 1.62 K/UL
LYMPHOCYTES NFR BLD AUTO: 20.3 %
MAN DIFF?: NORMAL
MCHC RBC-ENTMCNC: 30.1 PG
MCHC RBC-ENTMCNC: 30.3 GM/DL
MCV RBC AUTO: 99.3 FL
MONOCYTES # BLD AUTO: 0.73 K/UL
MONOCYTES NFR BLD AUTO: 9.2 %
NEUTROPHILS # BLD AUTO: 5.27 K/UL
NEUTROPHILS NFR BLD AUTO: 66.1 %
PLATELET # BLD AUTO: 261 K/UL
RBC # BLD: 4.35 M/UL
RBC # FLD: 13.5 %
WBC # FLD AUTO: 7.97 K/UL

## 2021-12-21 ENCOUNTER — APPOINTMENT (OUTPATIENT)
Dept: RADIATION ONCOLOGY | Facility: CLINIC | Age: 76
End: 2021-12-21
Payer: MEDICARE

## 2021-12-21 VITALS
OXYGEN SATURATION: 98 % | SYSTOLIC BLOOD PRESSURE: 150 MMHG | HEART RATE: 70 BPM | TEMPERATURE: 98 F | BODY MASS INDEX: 20.47 KG/M2 | WEIGHT: 135.03 LBS | HEIGHT: 68 IN | DIASTOLIC BLOOD PRESSURE: 83 MMHG | RESPIRATION RATE: 16 BRPM

## 2021-12-21 PROCEDURE — 99204 OFFICE O/P NEW MOD 45 MIN: CPT | Mod: GC,25

## 2021-12-21 RX ORDER — MAGNESIUM 200 MG
1000 TABLET ORAL DAILY
Qty: 30 | Refills: 1 | Status: DISCONTINUED | COMMUNITY
Start: 2021-08-24 | End: 2021-12-21

## 2021-12-21 NOTE — VITALS
[Maximal Pain Intensity: 0/10] : 0/10 [Least Pain Intensity: 0/10] : 0/10 [90: Able to carry normal activity; minor signs or symptoms of disease.] : 90: Able to carry normal activity; minor signs or symptoms of disease.  [ECOG Performance Status: 0 - Fully active, able to carry on all pre-disease performance without restriction] : Performance Status: 0 - Fully active, able to carry on all pre-disease performance without restriction [Date: ____________] : Patient's last distress assessment performed on [unfilled]. [2 - Distress Level] : Distress Level: 2

## 2021-12-29 NOTE — HISTORY OF PRESENT ILLNESS
[FreeTextEntry1] : Mr. Robert is a 76-year-old man with newly diagnosed prostate cancer, who presents for treatment recommendations. \par \par He went to see Dr. Fairbanks to establish new primary care and management of symptoms. As part of the initial evaluation, he had a PSA on 10/30/20 which was 24.20 ng/mL. He was referred to Urology but did not follow through and returned for re-evaluation one year later. \par \par The most recent PSA level was 33.40 ng/mL on 8/19/21. He saw Dr. Holland for urological evaluation. He underwent an MRI of the prostate on 9/26/21 which showed a 32 g prostate and three suspicious lesions PIRADS 4. The lesions were located in the right posterolateral, right posteromedial and left anterior mid-gland peripheral zone. They appeared similar, each measuring about 6-8 mm. \par \par Fusion biopsy of the prostate on 11/2/21 showed prostate cancer. MRI-targeted biopsies of the right posterolateral showed Clinton 3+4=7 adenocarcinoma in 2 out of 3 cores involving 40% of each with a 5% Clinton 4 component; right posteromedial showed Eden 3+4=7 adenocarcinoma in 4 out of 4 cores involving 5-70% with a 5% Eden 4 component and perineural invasion; left anterior mid-gland peripheral zone showed [place] 3+4=7 adenocarcinoma in 3 out of 3 cores involving  75-90% with a 5% Eden 4 component.  Template biopsy showed 4 out of 6 positive cores on the right with Clinton 3+4=7 and Clinton 4+4=8; and 5 out of 6 positive cores on the left with Eden 3+3=6, 3+4=7 and 4+3=7 adenocarcinoma. \par \par Bone scan on 11/24/21 did not show any evidence of metastasis. MRI of the abdomen on 11/22/21 did not show any evidence of metastasis. \par \par He was started on androgen deprivation therapy with Dr. Holland on 11/29/21, received Eligard 30mg injection, not on Casodex. He denies any symptoms, no significant hot flashes.  He is not significantly bothered by urinary symptoms.  Generally nocturia is 1 or 2 times.

## 2021-12-29 NOTE — REVIEW OF SYSTEMS
[Nocturia] : nocturia [Negative] : Heme/Lymph [IPSS Score (0-40): ___] : IPSS score: [unfilled] [EPIC-CP Score (0-60): ___] : EPIC-CP score: [unfilled] [Urinary Ostomy] : no ~T urinary ostomy present [Urinary Frequency] : no urinary frequency

## 2021-12-29 NOTE — DISEASE MANAGEMENT
[>20] : >20 ng/mL [Biopsy with Fusion] : Patient had a biopsy with fusion on [1] : T1 [c] : c [0] : M0 [8] : Template Biopsy Eden Score: 8 [7(3+4)] : Fusion Biopsy [place] Score: 7(3+4) [4] : 4 [IIIA] : IIIA [] : Patient had no CT scan performed [BiopsyDate] : 11/21 [MeasuredProstateVolume] : 33 [TotalCores] : 14 [TotalPositiveCores] : 19 [MaxCoreInvolvement] : 95 [CTresults] : MR AP negative [BoneScanResults] : negative

## 2022-03-05 ENCOUNTER — RX RENEWAL (OUTPATIENT)
Age: 77
End: 2022-03-05

## 2022-03-05 RX ORDER — ATORVASTATIN CALCIUM 10 MG/1
10 TABLET, FILM COATED ORAL
Qty: 90 | Refills: 1 | Status: ACTIVE | COMMUNITY
Start: 2021-09-24 | End: 1900-01-01

## 2022-03-05 RX ORDER — ERGOCALCIFEROL 1.25 MG/1
1.25 MG CAPSULE, LIQUID FILLED ORAL
Qty: 6 | Refills: 0 | Status: ACTIVE | COMMUNITY
Start: 2021-08-24 | End: 1900-01-01

## 2022-03-09 NOTE — ASU DISCHARGE PLAN (ADULT/PEDIATRIC) - HISTORY OF COVID-19 VACCINATION
[facility]  Office: [phone]  [place] Gabriella Shazia DO, Marylene Freiberg MD, Martina Jacobson MD, Starla Meraz MD, Autumn Presley MD, Margi Hull MD, Elaine Vidal MD, Renetta Hadley MD, Fatimah Valencia MD, Zara Villarreal MD, Mihir Godwin DO, Kiah Blackwood MD, Quinn Berman MD, Hedy Jimenez DO, Paddy Burrows MD,  Yon Crawford MD, Lo Knox MD, Wicho Gilmore CNP, [facility] CNP, Sera Arthur CNP, Jose G Saltness CNS, Vena Esteban CNP, Zula Mace CNP, Cathia Yany CNP, Shanon Jacquelin CNP, Karen Larios CNP, Burke Garcia PA-C, Ashley Dickinson CNP, Yvan Feliciano CNP, Bailey [place] CNP, Justin Gear CNP, Adrianne So CNP, Garwin Ok, [address] [facility]      Discharge Summary     Patient ID: Milad Rhodes  :  1957   MRN: [id number]     ACCOUNT:  [de-identified]   Patient Location : [address] [place] [address]  Patient's PCP: Cheli Fitzgerald  Admit Date: 3/7/2022   Discharge Date: 3/8/2022     Length of Stay: 1  Code Status:  Prior  Admitting Physician: Starla Meraz MD  Discharge Physician: Starla Meraz MD     Active Discharge Diagnosis :     Primary Problem  Fluid overload      [place] Problems    Diagnosis Date Noted    Fluid overload [E87.70] 2022    Acute respiratory failure ([facility] [address] [J96.00] 2021    ESRD on dialysis ([facility] [address] [N18.6, Z99.2] 2021    Anemia of chronic disease [D63.8] 2020    Hyponatremia [E87.1] 2020    Hypertensive urgency [I16.0] 2019    Hypertension, essential [I10]        Admission Condition:  fair     Discharged Condition: stable    Hospital Stay:     Hospital Course:  Milad Rhodes is a 76 y.o. male who was admitted for the management of   Fluid overload , presented to ER with Shortness of Breath  Patient presented to hospital with difficulty breathing for 4 days.   He has underlying history of ESRD on hemodialysis for last 1 year. Patient is on TTS schedule. He reported that he missed is dialysis for last few sessions. Patient reportedly missed dialysis for 1 week before and then had his last dialysis last Tuesday. He was seen and dialysis unit 2 days ago but reported that his blood sugar was low and patient was sent HOME. He has underlying history of insulin-dependent diabetes since age 27.  other comorbidities include COPD, diabetes, left carotid stenosis s/p CEA, hyperlipidemia. Patient reported that he has been admitted to the hospital multiple times with pulmonary edema. He lives with his wife at home. patient reported that his wife does not drive. Patient is homebound. He has multiple transportation issues. Initial evaluation showed elevated blood pressure 154/54, lab evaluation showed hyponatremia 132, BUN 71, creatinine 4.08, glucose 120, elevated proBNP 46,683, troponin I 37, WBC 8.4, hemoglobin 9.8. Chest x-ray showed mild pulmonary vascular congestion. Significant therapeutic interventions:   Acute pulmonary edema secondary to volume overload after missing hemodialysis. -Patient had emergent hemodialysis yesterday and is having scheduled hemodialysis today. Patient felt better after dialysis. Advised to work with  for transportation issues. ESRD on hemodialysis -on TTS schedule. Follow up with Dr Soren Benoit. Uncontrolled hypertension -continue clonidine, Coreg, Bumex, losartan, nifedipine. treated with nitro infusion. Insulin-dependent diabetes mellitus -diet as tolerated. Continue Lantus 10 units nightly. Adjust per PCP.  Needs titration to achieve goal .       Significant Diagnostic Studies:   Labs / Micro:/Radiology  Recent Labs     03/07/22  1615   WBC 8.4   HGB 9.8*   HCT 29.9*   MCV 95.5        Labs Renal Latest Ref Rng & Units 3/8/2022 3/7/2022 11/2/2021 11/1/2021 11/1/2021   BUN 8 - 23 mg/dL 43(H) 71(H) 45(H) - -   Cr 0.70 - 1.20 mg/dL 3.04(H) 4.08(H) 3.06(H) - -   K 3.7 - 5.3 mmol/L 3.8 4.4 5.2 5.8(H) 5. 6(H)   Na 135 - 144 mmol/L 133(L) 132(L) 132(L) 136 -     Lab Results   Component Value Date    ALT 33 09/09/2021    AST 27 09/09/2021    ALKPHOS 90 09/09/2021    BILITOT 0.28 (L) 09/09/2021     Lab Results   Component Value Date    TSH 2.91 05/15/2020     Lab Results   Component Value Date    HEPAIGM NONREACTIVE 12/04/2020    HEPBIGM NONREACTIVE 05/10/2021    HEPBCAB REACTIVE (A) 05/10/2021    HEPCAB NONREACTIVE 05/10/2021     Lab Results   Component Value Date    COLORU YELLOW 05/03/2021    NITRU NEGATIVE 05/03/2021    GLUCOSEU 1+ 05/03/2021    GLUCOSEU 3+ 11/12/2011    KETUA NEGATIVE 05/03/2021    UROBILINOGEN Normal 05/03/2021    BILIRUBINUR NEGATIVE 05/03/2021    BILIRUBINUR NEGATIVE 11/12/2011     Lab Results   Component Value Date    LABA1C 9.5 (H) 05/19/2021     Lab Results   Component Value Date     05/19/2021     Lab Results   Component Value Date    INR 0.9 03/08/2022    INR 0.9 05/03/2021    INR 0.9 12/03/2020    PROTIME 9.9 03/08/2022    PROTIME 9.6 05/03/2021    PROTIME 9.8 12/03/2020       XR CHEST PORTABLE    Result Date: 3/7/2022  Mild pulmonary vascular congestion             Consultations:    Consults:     Final Specialist Recommendations/Findings:   IP CONSULT TO NEPHROLOGY  IP CONSULT TO HOSPITALIST      The patient was seen and examined on day of discharge and this discharge summary is in conjunction with any daily progress note from day of discharge. Discharge plan:     Disposition: Home    Physician Follow Up:     Derrick Ansari MD  [address] [facility]  [phone]      dialysis per schedule Tuesday / Thursday / saturday schedule        Requiring Further Evaluation/Follow Up POST HOSPITALIZATION/Incidental Findings: follow up with Nephrology    Diet: diabetic diet and renal diet    Activity: As tolerated.     Instructions to Patient: follow up for dialysis     Discharge Medications:      Medication List      CONTINUE taking these medications    aspirin 81 MG EC tablet     atorvastatin 80 MG tablet  Commonly known as: LIPITOR     Basaglar KwikPen 100 UNIT/ML injection pen  Generic drug: insulin glargine  Inject 10 Units into the skin nightly     blood glucose monitor kit and supplies  Dispense sufficient amount for indicated testing frequency plus additional to accommodate PRN testing needs. Dispense all needed supplies to include: monitor, strips, lancing device, lancets, control solutions, alcohol swabs. blood glucose test strips  Test 4 times a day & as needed for symptoms of irregular blood glucose. Dispense sufficient amount for indicated testing frequency plus additional to accommodate PRN testing needs. bumetanide 1 MG tablet  Commonly known as: BUMEX     carvedilol 25 MG tablet  Commonly known as: COREG     cloNIDine 0.1 MG tablet  Commonly known as: CATAPRES     clopidogrel 75 MG tablet  Commonly known as: PLAVIX     Comfort EZ Pen Needles 31G X 8 MM Misc  Generic drug: Insulin Pen Needle  USE AS DIRECTED     fluticasone 50 MCG/ACT nasal spray  Commonly known as: FLONASE     gabapentin 100 MG capsule  Commonly known as: NEURONTIN     glucagon 1 MG injection  Inject 1 mg into the muscle See Admin Instructions Follow package directions for low blood sugar.      Glucerna Carbsteady Liqd  Take 1 Can by mouth 3 times daily     GNP Vitamin D Maximum Strength 50 MCG (2000 UT) Tabs  Generic drug: Cholecalciferol  TAKE 1 TABLET BY MOUTH ONCE DAILY     hydrALAZINE 100 MG tablet  Commonly known as: APRESOLINE     * ipratropium-albuterol 0.5-2.5 (3) MG/3ML Soln nebulizer solution  Commonly known as: DUONEB  Inhale 3 mLs into the lungs 4 times daily     * albuterol-ipratropium  MCG/ACT Aers inhaler  Commonly known as: Combivent Respimat  Inhale 1 puff into the lungs every 6 hours     Lancets Misc  Use_4__times daily Diagnisis:250.0  Diabetes Mellitus__x__Insulin Dependent___Non-Insulin Dependent     losartan 50 MG tablet  Commonly known as: COZAAR     magnesium oxide 400 MG tablet  Commonly known as: MAG-OX     montelukast 10 MG tablet  Commonly known as: SINGULAIR     NIFEdipine 90 MG extended release tablet  Commonly known as: ADALAT CC     nitroGLYCERIN 0.4 MG SL tablet  Commonly known as: NITROSTAT  USE 1 TABLET UNDER THE TONGUE UP TO MAXIMUM OF 3 TOTAL DOSES. IF NO RELIEF AFTER 1 DOSE, CALL 911. NovoLOG FlexPen 100 UNIT/ML injection pen  Generic drug: insulin aspart  Inject 5 Units into the skin 3 times daily (before meals) Sliding scale     prazosin 5 MG capsule  Commonly known as: MINIPRESS     QUEtiapine 100 MG tablet  Commonly known as: SEROQUEL  TAKE 1 TABLET BY MOUTH NIGHTLY     vitamin D 1.25 MG (65855 UT) Caps capsule  Commonly known as: ERGOCALCIFEROL  Take 1 capsule by mouth once a week         * This list has 2 medication(s) that are the same as other medications prescribed for you. Read the directions carefully, and ask your doctor or other care provider to review them with you. Time Spent on discharge is  25 mins in patient examination, evaluation, counseling as well as medication reconciliation, prescriptions for required medications, discharge plan and follow up. Electronically signed by   Laura Alcaraz MD  3/9/2022        Thank you Dr. Vern Rodas for the opportunity to be involved in this patient's care. Vaccine status unknown

## 2022-03-30 ENCOUNTER — APPOINTMENT (OUTPATIENT)
Dept: UROLOGY | Facility: CLINIC | Age: 77
End: 2022-03-30
Payer: MEDICARE

## 2022-03-30 VITALS
BODY MASS INDEX: 20.46 KG/M2 | HEART RATE: 91 BPM | RESPIRATION RATE: 14 BRPM | HEIGHT: 68 IN | WEIGHT: 135 LBS | SYSTOLIC BLOOD PRESSURE: 159 MMHG | OXYGEN SATURATION: 96 % | DIASTOLIC BLOOD PRESSURE: 93 MMHG | TEMPERATURE: 97.7 F

## 2022-03-30 PROCEDURE — 99214 OFFICE O/P EST MOD 30 MIN: CPT | Mod: 25

## 2022-03-30 PROCEDURE — 96402 CHEMO HORMON ANTINEOPL SQ/IM: CPT

## 2022-03-30 RX ORDER — LEUPROLIDE ACETATE 30 MG/.5ML
30 INJECTION, SUSPENSION, EXTENDED RELEASE SUBCUTANEOUS
Refills: 0 | Status: COMPLETED | OUTPATIENT
Start: 2022-03-30

## 2022-03-30 NOTE — HISTORY OF PRESENT ILLNESS
[FreeTextEntry1] : He is a 76-year-old man who is seen today with daughter for prostate cancer and ED.  He started androgen deprivation therapy November 2021 in anticipation of radiation therapy for prostate cancer.  Unfortunately he has not proceeded with radiation treatment yet.  He is concerned about worsening erectile dysfunction which has already occurred with androgen deprivation therapy.  He is receiving another Eligard injection today.  He is on vitamin D and calcium.  PSA level was 33.  Nocturia is usually 2 times.\par \par Previous notes: Bone scan did not show any evidence of metastasis and MRI of the abdomen did not show any evidence of metastasis either. \par \par MRI showed 32 g prostate and three suspicious lesions PI-RADS 4.  There was no obvious extra prostatic or seminal vesicle invasion or pelvic adenopathy.  Fusion biopsy of the prostate in November 2021 showed high-volume Eden 8 and Eden 7 prostate cancer from target and random prostate biopsy in most biopsied areas.  \par \par There is no weight loss or bone pain.  There is no hematuria or dysuria.  He is not aware of any family history of prostate cancer.  Urinalysis was normal.  Residual urine volume is about 50 cc.

## 2022-03-30 NOTE — PHYSICAL EXAM
[General Appearance - Well Developed] : well developed [General Appearance - Well Nourished] : well nourished [Normal Appearance] : normal appearance [Well Groomed] : well groomed [General Appearance - In No Acute Distress] : no acute distress [Abdomen Soft] : soft [Abdomen Tenderness] : non-tender [Costovertebral Angle Tenderness] : no ~M costovertebral angle tenderness [Urethral Meatus] : meatus normal [Penis Abnormality] : normal uncircumcised penis [Urinary Bladder Findings] : the bladder was normal on palpation [Scrotum] : the scrotum was normal [Testes Mass (___cm)] : there were no testicular masses [Prostate Tenderness] : the prostate was not tender [FreeTextEntry1] : Penile vitiligo, nodular prostate.  ANGÉLICA done in the past [] : no respiratory distress [Respiration, Rhythm And Depth] : normal respiratory rhythm and effort [Exaggerated Use Of Accessory Muscles For Inspiration] : no accessory muscle use [Normal Station and Gait] : the gait and station were normal for the patient's age

## 2022-03-30 NOTE — ASSESSMENT
[FreeTextEntry1] : Eligard 30 mg injection was given on the left side.  Side effects reviewed.  I had a long discussion with the patient and his daughter present.  He was made aware that androgen deprivation therapy is not considered a cure for prostate cancer.  He should consider returning to radiation oncologist to start radiation therapy.  It does not appear at this time that he is willing to do so.  Risks of continuous androgen deprivation was discussed with him.  He has experienced a few episodes of hot flashes.  He is concerned about erectile dysfunction.  He was made aware that androgen deprivation therapy leads to erectile dysfunction and he may consider PDE 5 inhibitors to see if symptoms improve.  Prescription for sildenafil 50 mg was provided.  He will follow up in 4 months for the next injection.\par \par Omer Holland MD, FACS\par [facility]\par  of Urology\par \par [address], [address]\[place] [address]\par \par [address], [address]\[place] [address]\par \par Tel: [phone]\par Fax: [phone]

## 2022-03-31 LAB — PSA SERPL-MCNC: 3.46 NG/ML

## 2022-03-31 RX ORDER — SILDENAFIL 50 MG/1
50 TABLET ORAL
Qty: 6 | Refills: 0 | Status: ACTIVE | COMMUNITY
Start: 2022-03-30 | End: 1900-01-01

## 2022-05-04 ENCOUNTER — APPOINTMENT (OUTPATIENT)
Dept: RADIATION ONCOLOGY | Facility: CLINIC | Age: 77
End: 2022-05-04
Payer: MEDICARE

## 2022-05-04 VITALS
BODY MASS INDEX: 20.68 KG/M2 | OXYGEN SATURATION: 95 % | SYSTOLIC BLOOD PRESSURE: 153 MMHG | DIASTOLIC BLOOD PRESSURE: 94 MMHG | HEART RATE: 85 BPM | RESPIRATION RATE: 16 BRPM | WEIGHT: 136 LBS

## 2022-05-04 DIAGNOSIS — Z78.9 OTHER SPECIFIED HEALTH STATUS: ICD-10-CM

## 2022-05-04 DIAGNOSIS — I10 ESSENTIAL (PRIMARY) HYPERTENSION: ICD-10-CM

## 2022-05-04 DIAGNOSIS — R97.20 ELEVATED PROSTATE, SPECIFIC ANTIGEN [PSA]: ICD-10-CM

## 2022-05-04 PROCEDURE — 99215 OFFICE O/P EST HI 40 MIN: CPT | Mod: 25

## 2022-05-04 PROCEDURE — 77263 THER RADIOLOGY TX PLNG CPLX: CPT

## 2022-05-04 NOTE — PHYSICAL EXAM
[Normal] : oriented to person, place and time, the affect was normal, the mood was normal and not anxious [FreeTextEntry1] : deferred [de-identified] : deferred [de-identified] : recovering shingles rash over right upper chest and back

## 2022-05-04 NOTE — REVIEW OF SYSTEMS
[Nocturia] : nocturia [Negative] : Allergic/Immunologic [Urinary Ostomy] : no ~T urinary ostomy present [Urinary Frequency] : no urinary frequency [FreeTextEntry5] : hypertension [FreeTextEntry8] : prostate cancer; organic impotence

## 2022-05-04 NOTE — LETTER GREETING
[Dear Doctor] : Dear Doctor, [Follow-Up] : Your patient, [unfilled] was seen in my office today for follow-up [Please see my note below.] : Please see my note below. [FreeTextEntry2] : Omer Holland MD

## 2022-05-04 NOTE — HISTORY OF PRESENT ILLNESS
[FreeTextEntry1] : This 77 year-old male presents for radiation medicine consultation.  Mr. Robert is a 76 year-old man with prognostic stage IIIA adenocarcinoma of the prostate with [place] score 4+4=8 and pretreatment PSA 33.40 ng/mL. He was seen for radiation consult by Dr. Shanita Del Valle.  Per Dr. Del Valle, the had a negative metastatic workup and was given Eligard 30 mg on 11/29/21.  \par \par Given the absence of any evidence of metastatic disease, he would be a candidate for curative treatment. Alternatively, a more conservative approach, given his age and co-morbidities (which are relatively modest) , would be hormone therapy alone. This would not be curative but may be effective for an extended period of time, until resistance develops. If that approach is preferred, then referral to Medical Oncology would be advised for consideration of additional/alternative systemic agents. \par

## 2022-05-04 NOTE — DISEASE MANAGEMENT
[1] : T1 [c] : c [0] : M0 [>20] : >20 ng/mL [Biopsy with Fusion] : Patient had a biopsy with fusion on [8] : Template Biopsy Eden Score: 8 [7(3+4)] : Fusion Biopsy [place] Score: 7(3+4) [4] : 4 [IIIA] : IIIA [] : Patient had no CT scan performed [BiopsyDate] : 11/2/2021 [MeasuredProstateVolume] : 33 [TotalCores] : 21 [TotalPositiveCores] : 19 [MaxCoreInvolvement] : 95 [CTresults] : MR AP negative [BoneScanResults] : negative

## 2022-05-04 NOTE — REASON FOR VISIT
[Consideration of Curative Therapy] : consideration of curative therapy for prostate cancer [Family Member] : family member

## 2022-05-04 NOTE — LETTER CLOSING
[Sincerely yours,] : Sincerely yours, [FreeTextEntry3] : Jesus Marina MD\par Physician in Chief\par Department of Radiation Medicine\par [facility] [place]\par [facility] [place]\par \par  of Radiation Medicine\par [facility]\par at  [facility]\par \par Radiation \par [facility]/\par [facility] [place]\par [address]\par [place], [place] [address]\par \par Tel: [phone]\par Fax: [phone]\par

## 2022-05-06 ENCOUNTER — OUTPATIENT (OUTPATIENT)
Dept: OUTPATIENT SERVICES | Facility: HOSPITAL | Age: 77
LOS: 1 days | Discharge: HOME | End: 2022-05-06
Payer: MEDICARE

## 2022-05-18 ENCOUNTER — APPOINTMENT (OUTPATIENT)
Dept: CARDIOLOGY | Facility: CLINIC | Age: 77
End: 2022-05-18
Payer: MEDICARE

## 2022-05-18 ENCOUNTER — NON-APPOINTMENT (OUTPATIENT)
Age: 77
End: 2022-05-18

## 2022-05-18 VITALS
HEIGHT: 68 IN | HEART RATE: 69 BPM | SYSTOLIC BLOOD PRESSURE: 138 MMHG | TEMPERATURE: 97.8 F | WEIGHT: 136 LBS | OXYGEN SATURATION: 96 % | DIASTOLIC BLOOD PRESSURE: 80 MMHG | BODY MASS INDEX: 20.61 KG/M2

## 2022-05-18 DIAGNOSIS — Z86.19 PERSONAL HISTORY OF OTHER INFECTIOUS AND PARASITIC DISEASES: ICD-10-CM

## 2022-05-18 DIAGNOSIS — Z86.79 PERSONAL HISTORY OF OTHER DISEASES OF THE CIRCULATORY SYSTEM: ICD-10-CM

## 2022-05-18 PROCEDURE — 99214 OFFICE O/P EST MOD 30 MIN: CPT

## 2022-05-18 PROCEDURE — 93000 ELECTROCARDIOGRAM COMPLETE: CPT

## 2022-05-18 RX ORDER — VALACYCLOVIR 1 G/1
1 TABLET, FILM COATED ORAL 3 TIMES DAILY
Qty: 21 | Refills: 0 | Status: ACTIVE | COMMUNITY
Start: 2022-05-18 | End: 1900-01-01

## 2022-05-18 RX ORDER — GABAPENTIN 100 MG/1
100 CAPSULE ORAL
Qty: 90 | Refills: 0 | Status: ACTIVE | COMMUNITY
Start: 2022-05-18 | End: 1900-01-01

## 2022-05-18 NOTE — PHYSICAL EXAM

## 2022-05-18 NOTE — REVIEW OF SYSTEMS
[Skin Lesions] : skin lesion(s): [Negative] : Heme/Lymph [Feeling Fatigued] : feeling fatigued [de-identified] : healed, pain to right arm, chets and back

## 2022-05-18 NOTE — HISTORY OF PRESENT ILLNESS
[FreeTextEntry1] : This is a 77 year old gentlemen with a PMH of HTN presents today for follow-up. Patient continues ot follow with Dr. Marina for prostate cancer, Patient states that he also recently had an episodes of shingles. reports he had lesions to his right arm, right chest and right back. Patient did not seek treatment at the time. Lesions have not healed and left scars but his pain is still present. Still having achy pain to his right arm, right chest and right back. Patient with no other issues or concerns for today.

## 2022-05-25 DIAGNOSIS — R89.9 UNSPECIFIED ABNORMAL FINDING IN SPECIMENS FROM OTHER ORGANS, SYSTEMS AND TISSUES: ICD-10-CM

## 2022-05-26 ENCOUNTER — NON-APPOINTMENT (OUTPATIENT)
Age: 77
End: 2022-05-26

## 2022-06-10 ENCOUNTER — OUTPATIENT (OUTPATIENT)
Dept: OUTPATIENT SERVICES | Facility: HOSPITAL | Age: 77
LOS: 1 days | Discharge: HOME | End: 2022-06-10
Payer: MEDICARE

## 2022-06-13 ENCOUNTER — NON-APPOINTMENT (OUTPATIENT)
Age: 77
End: 2022-06-13

## 2022-06-14 ENCOUNTER — APPOINTMENT (OUTPATIENT)
Dept: CARDIOLOGY | Facility: CLINIC | Age: 77
End: 2022-06-14

## 2022-06-14 PROCEDURE — 55876 PLACE RT DEVICE/MARKER PROS: CPT

## 2022-06-14 PROCEDURE — 76872 US TRANSRECTAL: CPT | Mod: 26

## 2022-06-14 PROCEDURE — 55874 TPRNL PLMT BIODEGRDABL MATRL: CPT

## 2022-06-14 NOTE — PROCEDURE
[FreeTextEntry1] : Procedure Performed: TRANSPERINEAL PLACEMENT OF SPACEOAR GEL AND MARKERS PLACEMENT. [FreeTextEntry2] : Indications IN PREPARATION FOR RADIATION THERAPY FOR PROSTATE CANCER TREATMENT. [FreeTextEntry3] : Procedure Note: Mr. Robert is a 77 year old patient with Eden score 4+4=8 adenocarcinoma of the prostate. He presents today for transperineal placement of spaceoar gel and markers in preparation for radiation therapy for his prostate cancer treatment.\par \par Procedure Note: In preparation for the procedure, he self-administered an enema one hour before leaving home and was NPO the night before procedure. He was prescribed a 3 days course of oral antibiotics twice daily to be started a day prior to the procedure. Tropical CARLO cream was applied to the perineal area one hour prior to procedure. Patient was prescribed and took Valium 5 mg and Tylenol 650 mg upon arrival in the department one hour to procedure. Procedure risk and benefits were reviewed with patient and a written consent was obtained prior to procedure. A time out was observed with patient name, date of birth, procedure, position, and site verified.\par \par Patient was placed in a lithotomy position. Chloral prep was used to prep the skin. While maintaining aseptic technique an ultrasound probe was inserted into the rectum to visualize the prostate. Less than 10 cc of Lidocaine 2% plus 8.4% bicarb was injected subcutaneously. Afterwards, 20 cc of Lidocaine and sodium bicarbonate was injected internally at the prostate apex and bilateral neurovascular bundles for the nerve block.\par \par Three [place] beacon markers were prepared on the sterile field. One marker was placed into each of the following sites: left lobe, right lobe and apex via 14 gauge needles under ultrasound guidance.\par \par Next, the hydrogel spacer kit was opened onto the sterile field and the hydrogel injection apparatus was prepared. An 18 gauge needle was positioned into the mid-line perirectal fat between the anterior rectal wall and prostate under ultrasound guidance. Less than 10 cc of saline was injected via the needle to hydrodissect the space and confirm proper placement in both axial and sagittal views. The syringe was aspirated to confirm the needle was extravascular. The syringe was replaced with the hydrogel injection apparatus and the gel was injected over about 10 seconds. The needle was then removed. There was minimal blood loss. The patient tolerated procedure well.\par \par Patient was transferred to the recovery area on a monitor. Vital signs were stable. He tolerated fluid and a snack by mouth and was made comfortable. He denied pain. Post procedure instruction were given and reviewed with patient. CT/SIM will be done with Dr. Marina. He was discharged home in a stable condition, accompanied by his wife.\par \par Impression/Plan\par \par Mr. Robert tolerated the procedure well. He will return for simulation and start radiation therapy shortly afterwards with Dr Marina. He was encouraged to contact me with any questions or concerns.

## 2022-06-29 PROCEDURE — 77301 RADIOTHERAPY DOSE PLAN IMRT: CPT | Mod: 26

## 2022-06-29 PROCEDURE — 77334 RADIATION TREATMENT AID(S): CPT | Mod: 26,59

## 2022-06-29 PROCEDURE — 77300 RADIATION THERAPY DOSE PLAN: CPT | Mod: 26

## 2022-07-07 PROCEDURE — G6017: CPT

## 2022-07-08 PROCEDURE — G6017: CPT

## 2022-07-11 ENCOUNTER — NON-APPOINTMENT (OUTPATIENT)
Age: 77
End: 2022-07-11

## 2022-07-11 VITALS
BODY MASS INDEX: 20.53 KG/M2 | OXYGEN SATURATION: 99 % | DIASTOLIC BLOOD PRESSURE: 91 MMHG | WEIGHT: 135 LBS | SYSTOLIC BLOOD PRESSURE: 165 MMHG | RESPIRATION RATE: 16 BRPM | HEART RATE: 73 BPM

## 2022-07-11 PROCEDURE — G6017: CPT

## 2022-07-11 NOTE — DISEASE MANAGEMENT
[Clinical] : TNM Stage: c [FreeTextEntry4] : adenocarcinoma [TTNM] : 1c [NTNM] : 0 [MTNM] : 0 [IIIA] : IIIA [de-identified] : 540 cGy [de-identified] : 4500 cGy plus 10,000 cGy boost [de-identified] : prostate

## 2022-07-12 PROCEDURE — G6017: CPT

## 2022-07-13 PROCEDURE — 77427 RADIATION TX MANAGEMENT X5: CPT

## 2022-07-13 PROCEDURE — G6017: CPT

## 2022-07-14 PROCEDURE — G6017: CPT

## 2022-07-15 PROCEDURE — G6017: CPT

## 2022-07-18 ENCOUNTER — NON-APPOINTMENT (OUTPATIENT)
Age: 77
End: 2022-07-18

## 2022-07-18 PROCEDURE — G6017: CPT

## 2022-07-18 NOTE — DISEASE MANAGEMENT
[Clinical] : TNM Stage: c [IIIA] : IIIA [FreeTextEntry4] : adenocarcinoma [NTNM] : 0 [TTNM] : 1c [MTNM] : 0 [de-identified] : 1448cGy [de-identified] : 4500 cGy plus 10,000 cGy boost [de-identified] : prostate

## 2022-07-18 NOTE — REVIEW OF SYSTEMS
[Constipation: Grade 0] : Constipation: Grade 0 [Diarrhea: Grade 1 - Increase of <4 stools per day over baseline; mild increase in ostomy output compared to baseline] : Diarrhea: Grade 1 - Increase of <4 stools per day over baseline; mild increase in ostomy output compared to baseline [Edema Limbs: Grade 0] : Edema Limbs: Grade 0  [Fatigue: Grade 0] : Fatigue: Grade 0 [Localized Edema: Grade 0] : Localized Edema: Grade 0  [Neck Edema: Grade 0] : Neck Edema: Grade 0 [Hematuria: Grade 0] : Hematuria: Grade 0 [Urinary Incontinence: Grade 0] : Urinary Incontinence: Grade 0  [Urinary Retention: Grade 0] : Urinary Retention: Grade 0 [Urinary Tract Pain: Grade 0] : Urinary Tract Pain: Grade 0 [Urinary Urgency: Grade 0] : Urinary Urgency: Grade 0 [Urinary Frequency: Grade 1 - Present] : Urinary Frequency: Grade 1 - Present [Skin Hyperpigmentation: Grade 0] : Skin Hyperpigmentation: Grade 0

## 2022-07-19 PROCEDURE — G6017: CPT

## 2022-07-21 PROCEDURE — G6017: CPT

## 2022-07-21 PROCEDURE — 77427 RADIATION TX MANAGEMENT X5: CPT

## 2022-07-22 PROCEDURE — G6017: CPT

## 2022-07-25 ENCOUNTER — NON-APPOINTMENT (OUTPATIENT)
Age: 77
End: 2022-07-25

## 2022-07-25 VITALS
DIASTOLIC BLOOD PRESSURE: 82 MMHG | WEIGHT: 141 LBS | RESPIRATION RATE: 16 BRPM | BODY MASS INDEX: 21.44 KG/M2 | TEMPERATURE: 98 F | OXYGEN SATURATION: 99 % | HEART RATE: 76 BPM | SYSTOLIC BLOOD PRESSURE: 158 MMHG

## 2022-07-25 PROCEDURE — G6017: CPT

## 2022-07-25 NOTE — DISEASE MANAGEMENT
[Clinical] : TNM Stage: c [IIIA] : IIIA [FreeTextEntry4] : adenocarcinoma [TTNM] : 1c [NTNM] : 0 [MTNM] : 0 [de-identified] : 3364 [de-identified] : 4500 cGy plus 10,000 cGy boost [de-identified] : prostate

## 2022-07-25 NOTE — REVIEW OF SYSTEMS
[Constipation: Grade 0] : Constipation: Grade 0 [Diarrhea: Grade 1 - Increase of <4 stools per day over baseline; mild increase in ostomy output compared to baseline] : Diarrhea: Grade 1 - Increase of <4 stools per day over baseline; mild increase in ostomy output compared to baseline [Edema Limbs: Grade 0] : Edema Limbs: Grade 0  [Fatigue: Grade 0] : Fatigue: Grade 0 [Localized Edema: Grade 0] : Localized Edema: Grade 0  [Neck Edema: Grade 0] : Neck Edema: Grade 0 [Hematuria: Grade 0] : Hematuria: Grade 0 [Urinary Incontinence: Grade 0] : Urinary Incontinence: Grade 0  [Urinary Retention: Grade 0] : Urinary Retention: Grade 0 [Urinary Tract Pain: Grade 0] : Urinary Tract Pain: Grade 0 [Urinary Frequency: Grade 1 - Present] : Urinary Frequency: Grade 1 - Present [Skin Hyperpigmentation: Grade 0] : Skin Hyperpigmentation: Grade 0 [Urinary Urgency: Grade 1 - Present] : Urinary Urgency: Grade 1 - Present

## 2022-07-25 NOTE — REASON FOR VISIT
[Routine On-Treatment] : a routine on-treatment visit for [Prostate Cancer] : prostate cancer [Family Member] : family member [Spouse] : spouse

## 2022-07-26 PROCEDURE — G6017: CPT

## 2022-07-27 PROCEDURE — G6017: CPT

## 2022-07-28 PROCEDURE — 77427 RADIATION TX MANAGEMENT X5: CPT

## 2022-07-28 PROCEDURE — G6017: CPT

## 2022-07-29 PROCEDURE — G6017: CPT

## 2022-08-01 ENCOUNTER — APPOINTMENT (OUTPATIENT)
Dept: UROLOGY | Facility: CLINIC | Age: 77
End: 2022-08-01

## 2022-08-01 PROCEDURE — G6017: CPT

## 2022-08-02 ENCOUNTER — APPOINTMENT (OUTPATIENT)
Dept: CARDIOLOGY | Facility: CLINIC | Age: 77
End: 2022-08-02

## 2022-08-02 ENCOUNTER — NON-APPOINTMENT (OUTPATIENT)
Age: 77
End: 2022-08-02

## 2022-08-02 VITALS
WEIGHT: 140 LBS | HEART RATE: 77 BPM | RESPIRATION RATE: 16 BRPM | SYSTOLIC BLOOD PRESSURE: 164 MMHG | OXYGEN SATURATION: 97 % | DIASTOLIC BLOOD PRESSURE: 89 MMHG | BODY MASS INDEX: 21.29 KG/M2

## 2022-08-02 VITALS
SYSTOLIC BLOOD PRESSURE: 110 MMHG | DIASTOLIC BLOOD PRESSURE: 70 MMHG | OXYGEN SATURATION: 98 % | BODY MASS INDEX: 21.07 KG/M2 | HEIGHT: 68 IN | WEIGHT: 139 LBS | TEMPERATURE: 98.1 F | HEART RATE: 64 BPM

## 2022-08-02 DIAGNOSIS — Z12.11 ENCOUNTER FOR SCREENING FOR MALIGNANT NEOPLASM OF COLON: ICD-10-CM

## 2022-08-02 DIAGNOSIS — R79.89 OTHER SPECIFIED ABNORMAL FINDINGS OF BLOOD CHEMISTRY: ICD-10-CM

## 2022-08-02 PROCEDURE — 99213 OFFICE O/P EST LOW 20 MIN: CPT

## 2022-08-02 PROCEDURE — G6017: CPT

## 2022-08-02 NOTE — HISTORY OF PRESENT ILLNESS
[FreeTextEntry1] : This is a 78 y/o male with a pmhx of HTN and prostate CA here today for a follow up.   . Pt is currently undergoing radiation treatment for prostate CA.  Denies chest pain, palpitations or dyspnea.

## 2022-08-02 NOTE — DISEASE MANAGEMENT
[Clinical] : TNM Stage: c [IIIA] : IIIA [FreeTextEntry4] : adenocarcinoma [TTNM] : 1c [NTNM] : 0 [MTNM] : 0 [de-identified] : 3240 cGy [de-identified] : 4500 cGy plus 10,000 cGy boost [de-identified] : prostate

## 2022-08-03 PROCEDURE — G6017: CPT

## 2022-08-04 PROCEDURE — 77427 RADIATION TX MANAGEMENT X5: CPT

## 2022-08-04 PROCEDURE — G6017: CPT

## 2022-08-05 PROCEDURE — G6017: CPT

## 2022-08-08 ENCOUNTER — NON-APPOINTMENT (OUTPATIENT)
Age: 77
End: 2022-08-08

## 2022-08-08 VITALS
OXYGEN SATURATION: 96 % | HEART RATE: 67 BPM | RESPIRATION RATE: 16 BRPM | DIASTOLIC BLOOD PRESSURE: 92 MMHG | BODY MASS INDEX: 21.01 KG/M2 | WEIGHT: 138.2 LBS | SYSTOLIC BLOOD PRESSURE: 178 MMHG

## 2022-08-08 PROCEDURE — G6017: CPT

## 2022-08-08 NOTE — DISEASE MANAGEMENT
[Clinical] : TNM Stage: c [FreeTextEntry4] : adenocarcinoma [TTNM] : 1c [NTNM] : 0 [MTNM] : 0 [IIIA] : IIIA [de-identified] : 3960 cGy [de-identified] : 4500 cGy plus 10,000 cGy boost [de-identified] : prostate

## 2022-08-09 PROCEDURE — G6017: CPT

## 2022-08-10 PROCEDURE — G6017: CPT

## 2022-08-11 PROCEDURE — G6017: CPT

## 2022-08-11 PROCEDURE — 77427 RADIATION TX MANAGEMENT X5: CPT

## 2022-08-12 ENCOUNTER — APPOINTMENT (OUTPATIENT)
Dept: UROLOGY | Facility: CLINIC | Age: 77
End: 2022-08-12

## 2022-08-12 VITALS — DIASTOLIC BLOOD PRESSURE: 68 MMHG | SYSTOLIC BLOOD PRESSURE: 110 MMHG | HEART RATE: 82 BPM

## 2022-08-12 PROCEDURE — 96402 CHEMO HORMON ANTINEOPL SQ/IM: CPT

## 2022-08-12 PROCEDURE — 99213 OFFICE O/P EST LOW 20 MIN: CPT | Mod: 25

## 2022-08-12 RX ORDER — LEUPROLIDE ACETATE 30 MG/.5ML
30 INJECTION, SUSPENSION, EXTENDED RELEASE SUBCUTANEOUS
Refills: 0 | Status: COMPLETED | OUTPATIENT
Start: 2022-08-12

## 2022-08-12 RX ADMIN — LEUPROLIDE ACETATE 0 MG: KIT SUBCUTANEOUS at 00:00

## 2022-08-12 NOTE — HISTORY OF PRESENT ILLNESS
[FreeTextEntry1] : He is a 77-year-old man who is seen today with daughter for prostate cancer and ED. patient states that he just finished external beam radiation therapy and seed implantation is scheduled for September 2021.  He is receiving leuprolide injection today.  He is experiencing hot flashes.  There is no significant change in his urinary symptoms.  PSA level decreased to 3.46 in March 2022.  Nocturia is twice.  He was prescribed sildenafil in the past for erectile dysfunction.\par \par He started androgen deprivation therapy November 2021. He is on vitamin D and calcium.  PSA level was 33.  \par \par Previous notes: Bone scan did not show any evidence of metastasis and MRI of the abdomen did not show any evidence of metastasis either. \par \par MRI showed 32 g prostate and three suspicious lesions PI-RADS 4.  There was no obvious extra prostatic or seminal vesicle invasion or pelvic adenopathy.  Fusion biopsy of the prostate in November 2021 showed high-volume Cheney 8 and Cheney 7 prostate cancer from target and random prostate biopsy in most biopsied areas.  \par \par There is no weight loss or bone pain.  There is no hematuria or dysuria.  He is not aware of any family history of prostate cancer.  Urinalysis was normal.  Residual urine volume is about 50 cc.

## 2022-08-12 NOTE — ASSESSMENT
[FreeTextEntry1] : Continue vitamin D and calcium.  Eligard 30 mg injection was given on the right side.  Side effects reviewed.  Continue to remain hydrated.  Weightbearing exercises reviewed.  He was given sildenafil in the past for erectile dysfunction.  He will continue androgen deprivation therapy for at least 1.5 to 2 years.  PSA level can be done at the next visit since he is still in the middle of radiation treatment.\par \par Omer Holland MD, FACS\par [facility]\par  of Urology\par \par [address], [address]\[place] [address]\par \par [address], [address]\[place] [address]\par \par Tel: [phone]\par Fax: [phone]

## 2022-08-12 NOTE — PHYSICAL EXAM
[General Appearance - Well Developed] : well developed [General Appearance - Well Nourished] : well nourished [Normal Appearance] : normal appearance [Well Groomed] : well groomed [General Appearance - In No Acute Distress] : no acute distress [Abdomen Soft] : soft [Abdomen Tenderness] : non-tender [Costovertebral Angle Tenderness] : no ~M costovertebral angle tenderness [Urethral Meatus] : meatus normal [Penis Abnormality] : normal uncircumcised penis [Urinary Bladder Findings] : the bladder was normal on palpation [Scrotum] : the scrotum was normal [Testes Mass (___cm)] : there were no testicular masses [Prostate Tenderness] : the prostate was not tender [FreeTextEntry1] : Penile vitiligo, nodular prostate.  ANGÉLICA done previously [] : no respiratory distress [Respiration, Rhythm And Depth] : normal respiratory rhythm and effort [Exaggerated Use Of Accessory Muscles For Inspiration] : no accessory muscle use

## 2022-09-08 ENCOUNTER — NON-APPOINTMENT (OUTPATIENT)
Age: 77
End: 2022-09-08

## 2022-09-08 LAB
25(OH)D3 SERPL-MCNC: 36.3 NG/ML
ALBUMIN SERPL ELPH-MCNC: 4.3 G/DL
ALP BLD-CCNC: 118 U/L
ALT SERPL-CCNC: 16 U/L
ANION GAP SERPL CALC-SCNC: 15 MMOL/L
ANION GAP SERPL CALC-SCNC: 16 MMOL/L
APPEARANCE: CLEAR
AST SERPL-CCNC: 21 U/L
BACTERIA: NEGATIVE
BASOPHILS # BLD AUTO: 0.03 K/UL
BASOPHILS NFR BLD AUTO: 0.5 %
BILIRUB SERPL-MCNC: 0.2 MG/DL
BILIRUBIN URINE: NEGATIVE
BLOOD URINE: NEGATIVE
BUN SERPL-MCNC: 18 MG/DL
BUN SERPL-MCNC: 19 MG/DL
CALCIUM SERPL-MCNC: 9.5 MG/DL
CALCIUM SERPL-MCNC: 9.6 MG/DL
CHLORIDE SERPL-SCNC: 104 MMOL/L
CHLORIDE SERPL-SCNC: 104 MMOL/L
CO2 SERPL-SCNC: 20 MMOL/L
CO2 SERPL-SCNC: 23 MMOL/L
COLOR: NORMAL
CREAT SERPL-MCNC: 1.14 MG/DL
CREAT SERPL-MCNC: 1.14 MG/DL
EGFR: 66 ML/MIN/1.73M2
EGFR: 66 ML/MIN/1.73M2
EOSINOPHIL # BLD AUTO: 0.28 K/UL
EOSINOPHIL NFR BLD AUTO: 4.8 %
FERRITIN SERPL-MCNC: 144 NG/ML
FERRITIN SERPL-MCNC: 233 NG/ML
FOLATE SERPL-MCNC: 8.1 NG/ML
GLUCOSE QUALITATIVE U: NEGATIVE
GLUCOSE SERPL-MCNC: 66 MG/DL
GLUCOSE SERPL-MCNC: 70 MG/DL
HAPTOGLOB SERPL-MCNC: 227 MG/DL
HCT VFR BLD CALC: 36.4 %
HGB BLD-MCNC: 11.8 G/DL
HYALINE CASTS: 0 /LPF
IMM GRANULOCYTES NFR BLD AUTO: 0.5 %
IRON SERPL-MCNC: 71 UG/DL
KETONES URINE: NEGATIVE
LDH SERPL-CCNC: 154 U/L
LEUKOCYTE ESTERASE URINE: NEGATIVE
LYMPHOCYTES # BLD AUTO: 0.86 K/UL
LYMPHOCYTES NFR BLD AUTO: 14.8 %
MAN DIFF?: NORMAL
MCHC RBC-ENTMCNC: 30.2 PG
MCHC RBC-ENTMCNC: 32.4 GM/DL
MCV RBC AUTO: 93.1 FL
MICROSCOPIC-UA: NORMAL
MONOCYTES # BLD AUTO: 0.73 K/UL
MONOCYTES NFR BLD AUTO: 12.6 %
NEUTROPHILS # BLD AUTO: 3.87 K/UL
NEUTROPHILS NFR BLD AUTO: 66.8 %
NITRITE URINE: NEGATIVE
PH URINE: 6
PLATELET # BLD AUTO: 207 K/UL
POTASSIUM SERPL-SCNC: 4.8 MMOL/L
POTASSIUM SERPL-SCNC: 4.8 MMOL/L
PROT SERPL-MCNC: 7.1 G/DL
PROTEIN URINE: NEGATIVE
RBC # BLD: 3.91 M/UL
RBC # FLD: 15.3 %
RED BLOOD CELLS URINE: 2 /HPF
SODIUM SERPL-SCNC: 140 MMOL/L
SODIUM SERPL-SCNC: 142 MMOL/L
SPECIFIC GRAVITY URINE: 1.02
SQUAMOUS EPITHELIAL CELLS: 0 /HPF
T4 FREE SERPL-MCNC: 1.2 NG/DL
TRANSFERRIN SERPL-MCNC: 196 MG/DL
TSH SERPL-ACNC: 0.37 UIU/ML
UROBILINOGEN URINE: NORMAL
VIT B12 SERPL-MCNC: 1556 PG/ML
WBC # FLD AUTO: 5.8 K/UL
WHITE BLOOD CELLS URINE: 0 /HPF

## 2022-09-13 ENCOUNTER — APPOINTMENT (OUTPATIENT)
Dept: CARDIOLOGY | Facility: CLINIC | Age: 77
End: 2022-09-13

## 2022-09-13 ENCOUNTER — OUTPATIENT (OUTPATIENT)
Dept: OUTPATIENT SERVICES | Facility: HOSPITAL | Age: 77
LOS: 1 days | End: 2022-09-13

## 2022-09-13 VITALS
SYSTOLIC BLOOD PRESSURE: 144 MMHG | OXYGEN SATURATION: 98 % | WEIGHT: 136.03 LBS | HEART RATE: 69 BPM | RESPIRATION RATE: 14 BRPM | DIASTOLIC BLOOD PRESSURE: 81 MMHG | HEIGHT: 68 IN | TEMPERATURE: 98 F

## 2022-09-13 VITALS
SYSTOLIC BLOOD PRESSURE: 120 MMHG | DIASTOLIC BLOOD PRESSURE: 70 MMHG | HEART RATE: 68 BPM | TEMPERATURE: 98.6 F | WEIGHT: 138 LBS | OXYGEN SATURATION: 96 % | BODY MASS INDEX: 20.92 KG/M2 | HEIGHT: 68 IN

## 2022-09-13 DIAGNOSIS — R97.20 ELEVATED PROSTATE SPECIFIC ANTIGEN [PSA]: Chronic | ICD-10-CM

## 2022-09-13 DIAGNOSIS — C61 MALIGNANT NEOPLASM OF PROSTATE: ICD-10-CM

## 2022-09-13 DIAGNOSIS — Z98.890 OTHER SPECIFIED POSTPROCEDURAL STATES: Chronic | ICD-10-CM

## 2022-09-13 DIAGNOSIS — Z01.818 ENCOUNTER FOR OTHER PREPROCEDURAL EXAMINATION: ICD-10-CM

## 2022-09-13 LAB
ALBUMIN SERPL ELPH-MCNC: 4.3 G/DL — SIGNIFICANT CHANGE UP (ref 3.3–5)
ALP SERPL-CCNC: 120 U/L — SIGNIFICANT CHANGE UP (ref 40–120)
ALT FLD-CCNC: 29 U/L — SIGNIFICANT CHANGE UP (ref 4–41)
ANION GAP SERPL CALC-SCNC: 14 MMOL/L — SIGNIFICANT CHANGE UP (ref 7–14)
AST SERPL-CCNC: 38 U/L — SIGNIFICANT CHANGE UP (ref 4–40)
BILIRUB SERPL-MCNC: 0.5 MG/DL — SIGNIFICANT CHANGE UP (ref 0.2–1.2)
BUN SERPL-MCNC: 13 MG/DL — SIGNIFICANT CHANGE UP (ref 7–23)
CALCIUM SERPL-MCNC: 9.4 MG/DL — SIGNIFICANT CHANGE UP (ref 8.4–10.5)
CHLORIDE SERPL-SCNC: 102 MMOL/L — SIGNIFICANT CHANGE UP (ref 98–107)
CO2 SERPL-SCNC: 21 MMOL/L — LOW (ref 22–31)
CREAT SERPL-MCNC: 1.13 MG/DL — SIGNIFICANT CHANGE UP (ref 0.5–1.3)
EGFR: 67 ML/MIN/1.73M2 — SIGNIFICANT CHANGE UP
GLUCOSE SERPL-MCNC: 75 MG/DL — SIGNIFICANT CHANGE UP (ref 70–99)
HCT VFR BLD CALC: 38.3 % — LOW (ref 39–50)
HGB BLD-MCNC: 12.5 G/DL — LOW (ref 13–17)
MCHC RBC-ENTMCNC: 30.6 PG — SIGNIFICANT CHANGE UP (ref 27–34)
MCHC RBC-ENTMCNC: 32.6 GM/DL — SIGNIFICANT CHANGE UP (ref 32–36)
MCV RBC AUTO: 93.9 FL — SIGNIFICANT CHANGE UP (ref 80–100)
NRBC # BLD: 0 /100 WBCS — SIGNIFICANT CHANGE UP (ref 0–0)
NRBC # FLD: 0 K/UL — SIGNIFICANT CHANGE UP (ref 0–0)
PLATELET # BLD AUTO: 201 K/UL — SIGNIFICANT CHANGE UP (ref 150–400)
POTASSIUM SERPL-MCNC: 3.8 MMOL/L — SIGNIFICANT CHANGE UP (ref 3.5–5.3)
POTASSIUM SERPL-SCNC: 3.8 MMOL/L — SIGNIFICANT CHANGE UP (ref 3.5–5.3)
PROT SERPL-MCNC: 7.8 G/DL — SIGNIFICANT CHANGE UP (ref 6–8.3)
RBC # BLD: 4.08 M/UL — LOW (ref 4.2–5.8)
RBC # FLD: 14 % — SIGNIFICANT CHANGE UP (ref 10.3–14.5)
SODIUM SERPL-SCNC: 137 MMOL/L — SIGNIFICANT CHANGE UP (ref 135–145)
WBC # BLD: 6.77 K/UL — SIGNIFICANT CHANGE UP (ref 3.8–10.5)
WBC # FLD AUTO: 6.77 K/UL — SIGNIFICANT CHANGE UP (ref 3.8–10.5)

## 2022-09-13 PROCEDURE — 93010 ELECTROCARDIOGRAM REPORT: CPT

## 2022-09-13 PROCEDURE — 99213 OFFICE O/P EST LOW 20 MIN: CPT

## 2022-09-13 RX ORDER — ASPIRIN/CALCIUM CARB/MAGNESIUM 324 MG
1 TABLET ORAL
Qty: 0 | Refills: 0 | DISCHARGE

## 2022-09-13 RX ORDER — AMLODIPINE BESYLATE 2.5 MG/1
1 TABLET ORAL
Qty: 0 | Refills: 0 | DISCHARGE

## 2022-09-13 RX ORDER — FAMOTIDINE 10 MG/ML
0 INJECTION INTRAVENOUS
Qty: 0 | Refills: 0 | DISCHARGE

## 2022-09-13 RX ORDER — PREGABALIN 225 MG/1
1 CAPSULE ORAL
Qty: 0 | Refills: 0 | DISCHARGE

## 2022-09-13 RX ORDER — ATORVASTATIN CALCIUM 80 MG/1
1 TABLET, FILM COATED ORAL
Qty: 0 | Refills: 0 | DISCHARGE

## 2022-09-13 RX ORDER — LOSARTAN POTASSIUM 100 MG/1
1 TABLET, FILM COATED ORAL
Qty: 0 | Refills: 0 | DISCHARGE

## 2022-09-13 RX ORDER — ERGOCALCIFEROL 1.25 MG/1
50000 CAPSULE ORAL
Qty: 0 | Refills: 0 | DISCHARGE

## 2022-09-13 NOTE — HISTORY OF PRESENT ILLNESS
[Preoperative Visit] : for a medical evaluation prior to surgery [Date of Surgery ___] : on [unfilled] [Anti-Platelet Agents] : anti-platelet agents [Renal Disease] : renal disease [Frequent Aspirin Use] : frequent aspirin use [Electrocardiogram] : ~T an ECG ~C was performed [Scheduled Procedure ___] : a [unfilled] [Surgeon Name ___] : surgeon: [unfilled] [Good] : Good [Fever] : no fever [Chills] : no chills [Fatigue] : no fatigue [Chest Pain] : no chest pain [Cough] : no cough [Dyspnea] : no dyspnea [Dysuria] : no dysuria [Urinary Frequency] : no urinary frequency [Nausea] : no nausea [Vomiting] : no vomiting [Diarrhea] : no diarrhea [Abdominal Pain] : no abdominal pain [Easy Bruising] : no easy bruising [Lower Extremity Swelling] : no lower extremity swelling [Poor Exercise Tolerance] : no poor exercise tolerance [Diabetes] : no diabetes [Cardiovascular Disease] : no cardiovascular disease [Pulmonary Disease] : no pulmonary disease [Alcohol Use] : no  alcohol use [GI Disease] : no gastrointestinal disease [Sleep Apnea] : no sleep apnea [Thromboembolic Problems] : no thromboembolic problems [Clotting Disorder] : no clotting disorder [Frequent use of NSAIDs] : no use of NSAIDs [Bleeding Disorder] : no bleeding disorder [Transfusion Reaction] : no transfusion reaction [Impaired Immunity] : no impaired immunity [Steroid Use in Last 6 Months] : no steroid use in the last six months [FreeTextEntry1] : This is a 76 y/o male with a pmhx of HTN and prostate CA s/p radiation who presents  today for clearance for placement of radioactive seeds with Dr. Díaz on 9/21/22. pt had PST done today. \par Pt reports feeling well Denies any complaints\par Denies any fever chills CP or SOB

## 2022-09-13 NOTE — H&P PST ADULT - HISTORY OF COVID-19 VACCINATION
Okay to leave message on home phone    Health Maintenance Summary     Topic Due On Due Status Completed On Postpone Until Reason    Osteoporosis Screening Mar 25, 2000 Postponed  Dec 31, 2017 Patient Refused    Immunization-Zoster  Completed Nov 24, 2014      Immunization - Pneumococcal  Completed Jan 12, 2016      Diabetes Eye Exam Mar 25, 1953 Postponed  Dec 31, 2017 Test ordered & Appt scheduled to perform    Glycohemoglobin A1C  (Diabetes Sugar)  Apr 3, 2018 Not Due Oct 3, 2017      GFR  (Kidney Function Test)  Sep 18, 2018 Not Due Sep 18, 2017      Diabetes Foot Exam  Mar 18, 2017 Postponed Mar 18, 2016 Dec 31, 2017 Test ordered & Appt scheduled to perform    Medicare Wellness Visit Sep 25, 2018 Not Due Sep 25, 2017      IMMUNIZATION - DTaP/Tdap/Td Mar 25, 1955 Overdue       Immunization-Influenza Sep 1, 2017 Overdue Sep 21, 2016       Sep 25, 2018 Not Due Sep 25, 2017            Patient is due for topics as listed above, she wishes to discuss with provider .       Yes

## 2022-09-13 NOTE — H&P PST ADULT - RESPIRATORY
normal/clear to auscultation bilaterally/no wheezes/no rales/no rhonchi/no use of accessory muscles/no subcutaneous emphysema/breath sounds equal/good air movement/respirations non-labored

## 2022-09-13 NOTE — H&P PST ADULT - PROBLEM SELECTOR PLAN 1
Pt scheduled for insertion of radioactive seeds into the prostate on 9/21/2022.  labs done results pending, ekg done, pt instructed to obtain preop covid testing.  Preop teaching done, pt able to verbalize understanding.   medications day of procedure- Amlodipine, losartan, pepcid  Pt instructed to continue asa  anesthesia- PATRICIO precautions.  pst request  medical eval - Dr. Fairbanks [phone]  echo 2021 in chart

## 2022-09-13 NOTE — H&P PST ADULT - HISTORY OF PRESENT ILLNESS
78y/o male scheduled for insertion of radioactive seeds into the prostate on 9/21/2022.  Pt claudia states, "hx of elevated psa, underwent transperineal fusion prostate 10/2021, was indecisive about treatment.  Underwent Prostate spacer placement 6/2022 followed radiation from 7/6/2022 to 8/10/2022."

## 2022-09-13 NOTE — H&P PST ADULT - NSICDXPASTMEDICALHX_GEN_ALL_CORE_FT
PAST MEDICAL HISTORY:  Elevated prostate specific antigen (PSA)     History of short term memory loss sporadic    HLD (hyperlipidemia)     HTN (hypertension)     Poor historian      PAST MEDICAL HISTORY:  Elevated prostate specific antigen (PSA)     History of short term memory loss sporadic    HLD (hyperlipidemia)     HTN (hypertension)     Malignant neoplasm of prostate s/p radiation 7/6/2022- 8/10/2022    Shingles 6/2022

## 2022-09-13 NOTE — H&P PST ADULT - GASTROINTESTINAL
details… normal/soft/nontender/nondistended/normal active bowel sounds/no guarding/no rigidity/no organomegaly/no palpable manuel/no masses palpable

## 2022-09-14 LAB
CULTURE RESULTS: SIGNIFICANT CHANGE UP
SPECIMEN SOURCE: SIGNIFICANT CHANGE UP

## 2022-09-20 ENCOUNTER — TRANSCRIPTION ENCOUNTER (OUTPATIENT)
Age: 77
End: 2022-09-20

## 2022-09-20 PROCEDURE — 77316 BRACHYTX ISODOSE PLAN SIMPLE: CPT | Mod: 26

## 2022-09-21 ENCOUNTER — OUTPATIENT (OUTPATIENT)
Dept: OUTPATIENT SERVICES | Facility: HOSPITAL | Age: 77
LOS: 1 days | Discharge: HOME | End: 2022-09-21

## 2022-09-21 ENCOUNTER — TRANSCRIPTION ENCOUNTER (OUTPATIENT)
Age: 77
End: 2022-09-21

## 2022-09-21 VITALS
OXYGEN SATURATION: 100 % | SYSTOLIC BLOOD PRESSURE: 156 MMHG | HEART RATE: 73 BPM | RESPIRATION RATE: 18 BRPM | DIASTOLIC BLOOD PRESSURE: 92 MMHG | TEMPERATURE: 98 F

## 2022-09-21 VITALS
TEMPERATURE: 98 F | OXYGEN SATURATION: 100 % | SYSTOLIC BLOOD PRESSURE: 125 MMHG | HEART RATE: 67 BPM | DIASTOLIC BLOOD PRESSURE: 78 MMHG | RESPIRATION RATE: 18 BRPM

## 2022-09-21 DIAGNOSIS — C61 MALIGNANT NEOPLASM OF PROSTATE: ICD-10-CM

## 2022-09-21 DIAGNOSIS — Z98.890 OTHER SPECIFIED POSTPROCEDURAL STATES: Chronic | ICD-10-CM

## 2022-09-21 DIAGNOSIS — R97.20 ELEVATED PROSTATE SPECIFIC ANTIGEN [PSA]: Chronic | ICD-10-CM

## 2022-09-21 PROCEDURE — 76965 ECHO GUIDANCE RADIOTHERAPY: CPT | Mod: 26

## 2022-09-21 PROCEDURE — 77295 3-D RADIOTHERAPY PLAN: CPT | Mod: 26

## 2022-09-21 PROCEDURE — 77331 SPECIAL RADIATION DOSIMETRY: CPT | Mod: 26

## 2022-09-21 PROCEDURE — 77332 RADIATION TREATMENT AID(S): CPT | Mod: 26

## 2022-09-21 PROCEDURE — 77778 APPLY INTERSTIT RADIAT COMPL: CPT | Mod: 26

## 2022-09-21 PROCEDURE — 55875 TRANSPERI NEEDLE PLACE PROS: CPT

## 2022-09-21 RX ORDER — GABAPENTIN 400 MG/1
1 CAPSULE ORAL
Qty: 0 | Refills: 0 | DISCHARGE

## 2022-09-21 RX ORDER — PREGABALIN 225 MG/1
1 CAPSULE ORAL
Qty: 0 | Refills: 0 | DISCHARGE

## 2022-09-21 RX ORDER — AMLODIPINE BESYLATE 2.5 MG/1
1 TABLET ORAL
Qty: 0 | Refills: 0 | DISCHARGE

## 2022-09-21 RX ORDER — ASPIRIN/CALCIUM CARB/MAGNESIUM 324 MG
1 TABLET ORAL
Qty: 0 | Refills: 0 | DISCHARGE

## 2022-09-21 RX ORDER — LOSARTAN POTASSIUM 100 MG/1
1 TABLET, FILM COATED ORAL
Qty: 0 | Refills: 0 | DISCHARGE

## 2022-09-21 NOTE — ASU DISCHARGE PLAN (ADULT/PEDIATRIC) - NS MD DC FALL RISK RISK
For information on Fall & Injury Prevention, visit: [email or web address] OR  [email or web address] OR  https://www.cdc.gov/steadi/patient.html

## 2022-10-03 ENCOUNTER — RX RENEWAL (OUTPATIENT)
Age: 77
End: 2022-10-03

## 2022-10-20 ENCOUNTER — APPOINTMENT (OUTPATIENT)
Dept: RADIATION ONCOLOGY | Facility: CLINIC | Age: 77
End: 2022-10-20

## 2022-10-20 ENCOUNTER — NON-APPOINTMENT (OUTPATIENT)
Age: 77
End: 2022-10-20

## 2022-10-20 PROBLEM — C61 MALIGNANT NEOPLASM OF PROSTATE: Chronic | Status: ACTIVE | Noted: 2022-09-13

## 2022-10-20 PROBLEM — B02.9 ZOSTER WITHOUT COMPLICATIONS: Chronic | Status: ACTIVE | Noted: 2022-09-13

## 2022-10-25 PROCEDURE — 77295 3-D RADIOTHERAPY PLAN: CPT | Mod: 26

## 2022-10-26 NOTE — HISTORY OF PRESENT ILLNESS
[FreeTextEntry1] : The patient is a 76 y/o man with modest co-morbidities, diagnosed with High Risk prostate cancer, Eden Score 8 (4+4) with a presenting PSA of 33.4 ng/ml and a prognostic stage of IIIA. He completed definitive radiation therapy to the prostate. He is currently on ADT per Dr. Holland.\par \par At the last on-treatment visit:\par Denied dysuria.\par Reported intermittent urgency when sitting in the sun, and mile hesitancy.\par Reported nocturia X2-3.\par Reported loose stools now more often formed.\par \par Mr. Robert is now s/p prostate seed boost on 9/21/22.\par Reports urinary hesitancy, taking tamsulosin.\par Reports regular bowel movements.\par \par

## 2022-10-26 NOTE — PHYSICAL EXAM
[Normal] : oriented to person, place and time, the affect was normal, the mood was normal and not anxious [de-identified] : deferred

## 2022-10-26 NOTE — DISEASE MANAGEMENT
[FreeTextEntry4] : adenocarcinoma [TTNM] : 1c [NTNM] : 0 [MTNM] : 0 [de-identified] : 4500 cGy plus 10,000 cGy boost [de-identified] : prostate

## 2022-10-26 NOTE — REVIEW OF SYSTEMS
[Negative] : Allergic/Immunologic [FreeTextEntry5] : hypertension [FreeTextEntry8] : radiation therapy including brachytherapy for prostate cancer, ED [de-identified] : receiving Eligard

## 2022-12-07 ENCOUNTER — APPOINTMENT (OUTPATIENT)
Dept: PULMONOLOGY | Facility: CLINIC | Age: 77
End: 2022-12-07

## 2022-12-07 ENCOUNTER — NON-APPOINTMENT (OUTPATIENT)
Age: 77
End: 2022-12-07

## 2022-12-07 ENCOUNTER — APPOINTMENT (OUTPATIENT)
Dept: CARDIOLOGY | Facility: CLINIC | Age: 77
End: 2022-12-07

## 2022-12-07 VITALS
HEIGHT: 68 IN | BODY MASS INDEX: 22.05 KG/M2 | DIASTOLIC BLOOD PRESSURE: 80 MMHG | SYSTOLIC BLOOD PRESSURE: 140 MMHG | WEIGHT: 145.5 LBS | HEART RATE: 87 BPM | TEMPERATURE: 98.6 F | OXYGEN SATURATION: 97 %

## 2022-12-07 DIAGNOSIS — Z71.85 ENCOUNTER FOR IMMUNIZATION SAFETY COUNSELING: ICD-10-CM

## 2022-12-07 DIAGNOSIS — M79.603 PAIN IN ARM, UNSPECIFIED: ICD-10-CM

## 2022-12-07 DIAGNOSIS — Z23 ENCOUNTER FOR IMMUNIZATION: ICD-10-CM

## 2022-12-07 PROCEDURE — 71046 X-RAY EXAM CHEST 2 VIEWS: CPT

## 2022-12-07 PROCEDURE — 93000 ELECTROCARDIOGRAM COMPLETE: CPT

## 2022-12-07 PROCEDURE — 99214 OFFICE O/P EST MOD 30 MIN: CPT | Mod: 25

## 2022-12-07 RX ORDER — DICLOFENAC SODIUM 1 %
1 KIT TOPICAL
Qty: 1 | Refills: 0 | Status: ACTIVE | COMMUNITY
Start: 2022-12-07

## 2022-12-07 NOTE — HISTORY OF PRESENT ILLNESS
[FreeTextEntry1] : This is a 77 year male with a Pmhx of anemia, HTN, prostate ca s/p radiation s/p seeds who presents to the office for routine follow up\par \par pt reports BUTLER when going up the stairs\par Denies any chest pain \par pt also with arm pain at site of flu shot 2 mths ago given \par Pt denies any heart palpitations, dizziness, abdominal pain N/V/D fever or chills\par

## 2022-12-13 ENCOUNTER — APPOINTMENT (OUTPATIENT)
Dept: ORTHOPEDIC SURGERY | Facility: CLINIC | Age: 77
End: 2022-12-13

## 2022-12-13 VITALS
WEIGHT: 145 LBS | BODY MASS INDEX: 21.98 KG/M2 | HEIGHT: 68 IN | HEART RATE: 73 BPM | SYSTOLIC BLOOD PRESSURE: 129 MMHG | DIASTOLIC BLOOD PRESSURE: 84 MMHG

## 2022-12-13 PROCEDURE — 99203 OFFICE O/P NEW LOW 30 MIN: CPT

## 2022-12-13 PROCEDURE — 73030 X-RAY EXAM OF SHOULDER: CPT | Mod: LT

## 2022-12-13 NOTE — PHYSICAL EXAM
[de-identified] : General:\par Awake, alert, no acute distress, Patient was cooperative and appropriate during the examination.\par \par The patient is of normal weight for height and age.\par \par Walks without an antalgic gait.\par \par Full, painless range of motion of the neck and back.\par \par Exam of the bilateral lower extremities is intact and symmetric with regards to dermatologic, vascular, and neurologic exam. Bilateral lower extremity sensation is grossly intact to light touch in the DP/SP/T/S/S nerve distributions. Intact DF/PF/EHL. BIlateral lower extremities warm and well-perfused with brisk capillary refill.\par \par \par Pulmonary:\par Regular, nonlabored breathing\par \par Abdomen:\par Soft, nontender, nondistended.\par \par Lymphatic:\par No evidence of axillary lymphadenopathy\par \par Left shoulder Exam:\par Physical exam of the shoulder demonstrates normal skin without signs of skin changes or abnormalities. No erythema, warmth, or joint effusion appreciated.  \par  \par Sensation intact light touch C5-T1\par Palpable radial pulse\par Radial/ulnar/median/axillary/musculocutaneous/AIN/PIN nerves grossly intact\par  \par Range of motion:\par Forward Flexion: 180\par Abduction: 150\par External Rotation: 60\par Internal Rotation: T7\par  \par Palpation:\par Not tender to palpation over the glenohumeral joint\par Mildly tender palpation over the rotator cuff insertion on the greater tuberosity\par Not tender to palpation over the AC joint\par Mildly tender to palpation of the biceps tendon/bicipital groove\par  \par Strength testing:\par Supraspinatus: 5/5\par Infraspinatus: 5/5\par Subscapularis: 5/5\par  \par Special test:\par Empty can test mildly positive\par Rios impingement test negative\par Speeds test negative\par Ripley's test negative\par Lift-off test negative\par Bell-press test negative\par Cross-arm adduction test negative\par  [de-identified] : X-ray 4 views of the left shoulder taken in the office today on 12/13/2022 reveals no acute fracture or dislocation.

## 2022-12-13 NOTE — CONSULT LETTER
[FreeTextEntry2] : Doesnt have PCP\par  [FreeTextEntry3] : Nahid Escobar DO.\par Sports Medicine \par Orthopaedic Surgery\par \par [facility] \par [address]\[place] [address]\par \par Tel [phone]\par Fax [phone]\par \par For same day and next day orthopedic appointments contact:\par [email or web address] |[phone] Appointments available nights and weekends!  \par \par [facility]\par Visit us at [facility]/orthopaedic\par

## 2022-12-13 NOTE — DISCUSSION/SUMMARY
[de-identified] : Assessment: 77-year-old male with left shoulder rotator cuff tendinitis, bursitis\par \par Plan: I had a long discussion with the patient today regarding the nature of their diagnosis and treatment plan. We discussed the risks and benefits of no treatment as well as nonoperative and operative treatments.  I reviewed the x-rays with the patient today that revealed no acute bony pathology.  At this time I recommend conservative treatment of the patient's condition with modalities including rest, ice, heat, anti-inflammatory medications, activity modifications, and home stretching and strengthening exercises daily. I discussed with the patient the risks and benefits associated with NSAID use.  Overall the patient is feeling much better and he deferred any anti-inflammatory prescriptions or physical therapy at this time.  He may follow-up on as-needed basis.\par  \par The patient verbalizes their understanding and agrees with the plan.  All questions were answered to their satisfaction.

## 2022-12-13 NOTE — HISTORY OF PRESENT ILLNESS
[Pain Location] : pain [] : left shoulder [Worsening] : worsening [Intermit.] : ~He/She~ states the symptoms seem to be intermittent [Direct Pressure] : worsened by direct pressure [Lifting] : worsened by lifting [Rest] : relieved by rest [de-identified] : 12/13/2022 : MAIDA ADAN  is a 77 year  old male who presents to the office for evaluation of his left shoulder.  He states a couple months ago he had a flu vaccine into his left shoulder and since then has had worsening pain.  He states today is the first day the pain is feeling better.  He states prior to the flu vaccine he never had any pain in his shoulder but recently over the last couple months has had significant pain.  He has not taken any medication nor done any physical therapy nor ever seen a doctor for this before but due to his continued pain he is here for specialist opinion today.  He denies any numbness or tingling distally.  He has no other complaints today.  He localized the pain over the lateral and anterior aspect of the shoulder.

## 2022-12-23 ENCOUNTER — APPOINTMENT (OUTPATIENT)
Dept: UROLOGY | Facility: CLINIC | Age: 77
End: 2022-12-23

## 2022-12-23 VITALS — DIASTOLIC BLOOD PRESSURE: 81 MMHG | HEART RATE: 77 BPM | SYSTOLIC BLOOD PRESSURE: 150 MMHG | OXYGEN SATURATION: 98 %

## 2022-12-23 PROCEDURE — 99213 OFFICE O/P EST LOW 20 MIN: CPT | Mod: 25

## 2022-12-23 PROCEDURE — 96402 CHEMO HORMON ANTINEOPL SQ/IM: CPT

## 2022-12-23 RX ORDER — LEUPROLIDE ACETATE 30 MG/.5ML
30 INJECTION, SUSPENSION, EXTENDED RELEASE SUBCUTANEOUS
Qty: 1 | Refills: 0 | Status: ACTIVE | COMMUNITY
Start: 2022-12-23

## 2022-12-23 RX ORDER — LEUPROLIDE ACETATE 30 MG/.5ML
30 INJECTION, SUSPENSION, EXTENDED RELEASE SUBCUTANEOUS
Refills: 0 | Status: COMPLETED | OUTPATIENT
Start: 2022-12-23

## 2022-12-23 RX ADMIN — LEUPROLIDE ACETATE 0 MG: KIT SUBCUTANEOUS at 00:00

## 2022-12-23 NOTE — PHYSICAL EXAM
[Urethral Meatus] : meatus normal [Penis Abnormality] : normal uncircumcised penis [Urinary Bladder Findings] : the bladder was normal on palpation [Scrotum] : the scrotum was normal [Testes Mass (___cm)] : there were no testicular masses [Prostate Tenderness] : the prostate was not tender [General Appearance - Well Developed] : well developed [General Appearance - Well Nourished] : well nourished [Normal Appearance] : normal appearance [Well Groomed] : well groomed [General Appearance - In No Acute Distress] : no acute distress [Abdomen Soft] : soft [Abdomen Tenderness] : non-tender [Costovertebral Angle Tenderness] : no ~M costovertebral angle tenderness [FreeTextEntry1] : Penile vitiligo, nodular prostate.  Prostate exam done previously [] : no respiratory distress [Respiration, Rhythm And Depth] : normal respiratory rhythm and effort [Exaggerated Use Of Accessory Muscles For Inspiration] : no accessory muscle use [Oriented To Time, Place, And Person] : oriented to person, place, and time [Affect] : the affect was normal [Mood] : the mood was normal [Not Anxious] : not anxious

## 2022-12-23 NOTE — HISTORY OF PRESENT ILLNESS
[FreeTextEntry1] : He is a 77-year-old man who is seen today with daughter for prostate cancer and ED.  He is not complaining of hot flashes any longer.  He finished radiation therapy plus brachytherapy in September 2022.  He seems to be relatively satisfied with urination.  He was initially on tamsulosin twice a day, then once a day and now every other day.  Nocturia is twice.  PSA level in December 2022 decreased to 0.02. He was prescribed sildenafil in the past for erectile dysfunction. He started androgen deprivation therapy in November 2021.  PSA level was 33.  \par \par Previous notes: Bone scan did not show any evidence of metastasis and MRI of the abdomen did not show any evidence of metastasis either. \par \par MRI showed 32 g prostate and three suspicious lesions PI-RADS 4.  There was no obvious extra prostatic or seminal vesicle invasion or pelvic adenopathy.  Fusion biopsy of the prostate in November 2021 showed high-volume Eden 8 and Eden 7 prostate cancer from target and random prostate biopsy in most biopsied areas.  \par \par There is no weight loss or bone pain.  There is no hematuria or dysuria.  He is not aware of any family history of prostate cancer.  Urinalysis was normal.  Residual urine volume is about 50 cc.

## 2022-12-23 NOTE — ASSESSMENT
[FreeTextEntry1] : Eligard 30 mg injection was given on the left side.  Again side effects discussed.  Continue vitamin D and calcium.  He will continue for at least 1.5 to 2 years of androgen deprivation therapy.  He was prescribed sildenafil in the past for erectile dysfunction.  He will follow-up in 4 months for the next injection.\par \par Omer Holland MD, FACS\par [facility]\par  of Urology\par \par [address], [address]\[place] [address]\par \par [address]\[place] [address]\par \par Tel: [phone]\par Fax: [phone]

## 2023-01-12 ENCOUNTER — APPOINTMENT (OUTPATIENT)
Dept: CARDIOLOGY | Facility: CLINIC | Age: 78
End: 2023-01-12
Payer: MEDICARE

## 2023-01-12 DIAGNOSIS — R94.39 ABNORMAL RESULT OF OTHER CARDIOVASCULAR FUNCTION STUDY: ICD-10-CM

## 2023-01-12 PROCEDURE — 93015 CV STRESS TEST SUPVJ I&R: CPT

## 2023-01-12 PROCEDURE — 93306 TTE W/DOPPLER COMPLETE: CPT

## 2023-01-18 ENCOUNTER — NON-APPOINTMENT (OUTPATIENT)
Age: 78
End: 2023-01-18

## 2023-01-18 ENCOUNTER — APPOINTMENT (OUTPATIENT)
Dept: RADIATION ONCOLOGY | Facility: CLINIC | Age: 78
End: 2023-01-18
Payer: MEDICARE

## 2023-01-18 VITALS
SYSTOLIC BLOOD PRESSURE: 134 MMHG | BODY MASS INDEX: 22.81 KG/M2 | RESPIRATION RATE: 16 BRPM | HEART RATE: 87 BPM | DIASTOLIC BLOOD PRESSURE: 81 MMHG | OXYGEN SATURATION: 98 % | WEIGHT: 150 LBS

## 2023-01-18 PROCEDURE — 99213 OFFICE O/P EST LOW 20 MIN: CPT

## 2023-01-20 NOTE — LETTER CLOSING
[Sincerely yours,] : Sincerely yours, [FreeTextEntry3] : Jesus Marina MD\par Physician in Chief\par Department of Radiation Medicine\par [facility] [place]\par [facility] [place]\par \par  of Radiation Medicine\par [facility]\par at  [facility] [place]\par \par Radiation \par [facility]/\par [facility] [place]\par [address]\par [place], [place] [address]\par \par Tel: [phone]\par Fax: [phone]\par

## 2023-01-20 NOTE — ASSESSMENT
[No evidence of disease] : No evidence of disease [FreeTextEntry1] : stable treatment related sequelae largely sexual dysfunction related

## 2023-01-20 NOTE — PHYSICAL EXAM
[Normal] : oriented to person, place and time, the affect was normal, the mood was normal and not anxious [FreeTextEntry1] : deferred [de-identified] : deferred

## 2023-01-20 NOTE — DISEASE MANAGEMENT
[1] : T1 [c] : c [0] : M0 [>20] : >20 ng/mL [Biopsy] : Patient had a biopsy on [8] : Template Biopsy Eden Score: 8 [IIIA] : IIIA

## 2023-01-20 NOTE — HISTORY OF PRESENT ILLNESS
[FreeTextEntry1] : The patient is a 78 y/o man with modest co-morbidities, diagnosed with High Risk prostate cancer, Eden Score 8 (4+4) with a presenting PSA of 33.4 ng/ml and a prognostic stage of IIIA. He completed definitive radiation therapy to the prostate. He is currently on ADT per Dr. Holland.\par  \par Mr. Robert is now s/p prostate seed boost on 9/21/22.\par Reports urinary hesitancy, taking tamsulosin.\par Reports regular bowel movements.\par Notes no sexual function which is a big problem for him.\par

## 2023-01-25 ENCOUNTER — APPOINTMENT (OUTPATIENT)
Dept: CARDIOLOGY | Facility: CLINIC | Age: 78
End: 2023-01-25
Payer: MEDICARE

## 2023-01-25 PROCEDURE — 93015 CV STRESS TEST SUPVJ I&R: CPT

## 2023-01-25 PROCEDURE — 78452 HT MUSCLE IMAGE SPECT MULT: CPT

## 2023-01-25 PROCEDURE — A9500: CPT

## 2023-02-01 NOTE — H&P PST ADULT - NSICDXPASTSURGICALHX_GEN_ALL_CORE_FT
Discharge medication calendar:  Eliquis 2.5mg q12h x 2 weeks then ASA EC 81mg q12h x 2 weeks  APAP 1000mg q8h x 2-3 weeks  Celecoxib 100mg q12h x 2 weeks  Pantoprazole 40mg BID (home med)  Narcotic PRN  Docusate 100mg TID while taking narcotic  Miralax, Senna, or Bisacodyl PRN for treatment of constipation                                                                                  ORTHOPEDIC ATTENDING PROGRESS NOTE  DELPHINE BEAULIEU      74y Female                                                                                                                               POD #   1     STATUS POST:               Pre-Op Dx: Primary osteoarthritis of right knee      Post-Op Dx:  Primary osteoarthritis of right knee      Procedure: Right total knee arthroplasty                                                  Pain (0-10):  Pt reports  Current Pain Management:  [ ] PCA   [x] Po Analgesics [ ] IM /IV Anagesics     T(F): 97.6  HR: 66  BP: 108/68  RR: 20  SpO2: 97%                         11.0   14.45 )-----------( 207      ( 01 Feb 2023 06:00 )             33.9         02-01    138  |  107  |  10  ----------------------------<  117<H>  5.1   |  26  |  0.66    Ca    8.7      01 Feb 2023 06:00      Physical Exam :    -  Dressing C/D/I.   -   Distal Neurvascular status intact grossly.   -   Warm well perfused; capillary refill <3 seconds   -   (+)EHL/FHL   -   (+) Sensation to light touch  -   (-) Calf tenderness Bilaterally    A/P: 74y Female s/p Right total knee arthroplasty       -   Ortho Stable  -   Pain control   -   Medicine to follow  -   DVT ppx:     [ ]SCDs     [x ] ASA     [ ] Eliquis     [ ] Lovenox  -   Weight bearing status:  WBAT [x ]        PWB    [ ]     TTWB  [ ]      NWB  [ ]   -  Dispo:     Home [x ]     Acute Rehab [ ]     CHIQUITA [ ]     TBD [ ] Patient is a 74y old  Female who presents with a chief complaint of s/p right TKR (31 Jan 2023 15:55)      INTERVAL HPI/OVERNIGHT EVENTS:  Patient seen and examined in am, no current complaints, pain is fair controlled, able to ambulate in hallway, denies dizziness, SOB, chest pain, nausea, vomiting.    ROS reviewed and is otherwise negative        Vital Signs Last 24 Hrs  T(C): 36.4 (01 Feb 2023 07:41), Max: 36.8 (31 Jan 2023 19:11)  T(F): 97.6 (01 Feb 2023 07:41), Max: 98.2 (31 Jan 2023 19:11)  HR: 66 (01 Feb 2023 07:41) (54 - 89)  BP: 108/68 (01 Feb 2023 07:41) (96/74 - 155/70)  BP(mean): --  RR: 20 (01 Feb 2023 07:41) (15 - 24)  SpO2: 97% (01 Feb 2023 07:41) (93% - 100%)    Parameters below as of 01 Feb 2023 07:41  Patient On (Oxygen Delivery Method): room air        PHYSICAL EXAM:  GENERAL: NAD, older female  HEAD:  Atraumatic, Normocephalic  EYES: EOMI, PERRLA  ENMT: Moist mucous membranes,  No lesions;   NECK: Supple, No JVD,   NERVOUS SYSTEM:  Alert & Oriented X3, Good concentration; All 4 extremities mobile, no gross sensory deficits.   CHEST/LUNG: Clear to auscultation bilaterally; No rales, rhonchi, wheezing, or rubs  HEART: Regular rate and rhythm; No murmurs, rubs, or gallops  ABDOMEN: Soft, Nontender, Nondistended; Bowel sounds present  EXTREMITIES:  + Pulses, right knee wrapped in ace bandage  SKIN: No rashes or lesions    MEDICATIONS  (STANDING):  acetaminophen     Tablet .. 1000 milliGRAM(s) Oral every 8 hours  apixaban 2.5 milliGRAM(s) Oral <User Schedule>  atorvastatin 40 milliGRAM(s) Oral at bedtime  famotidine    Tablet 40 milliGRAM(s) Oral at bedtime  labetalol 100 milliGRAM(s) Oral at bedtime  labetalol 200 milliGRAM(s) Oral daily  lactated ringers. 1000 milliLiter(s) (100 mL/Hr) IV Continuous <Continuous>  montelukast 10 milliGRAM(s) Oral daily  pantoprazole    Tablet 40 milliGRAM(s) Oral before breakfast  polyethylene glycol 3350 17 Gram(s) Oral at bedtime  senna 2 Tablet(s) Oral at bedtime    MEDICATIONS  (PRN):  HYDROmorphone  Injectable 0.5 milliGRAM(s) IV Push every 3 hours PRN breakthrough  magnesium hydroxide Suspension 30 milliLiter(s) Oral daily PRN Constipation  ondansetron Injectable 4 milliGRAM(s) IV Push every 6 hours PRN Nausea and/or Vomiting  oxyCODONE    IR 5 milliGRAM(s) Oral every 3 hours PRN Moderate Pain (4 - 6)  oxyCODONE    IR 10 milliGRAM(s) Oral every 3 hours PRN Severe Pain (7 - 10)      Allergies    latex (Hives)  No Known Drug Allergies    Intolerances          LABS:                        11.0   14.45 )-----------( 207      ( 01 Feb 2023 06:00 )             33.9     01 Feb 2023 06:00    138    |  107    |  10     ----------------------------<  117    5.1     |  26     |  0.66     Ca    8.7        01 Feb 2023 06:00          CAPILLARY BLOOD GLUCOSE          RADIOLOGY & ADDITIONAL TESTS:        Care Discussed with Consultants/Other Providers:    Advanced Directives: [ ] DNR  [ ] No feeding tube  [ ] MOLST in chart  [ ] MOLST completed today  [ ] Unknown   SUBJECTIVE: Post operative note. Patient seen and examined. No nausea/vomiting, nor shortness of breath. Patient participated with PT.    OBJECTIVE:     Vital Signs Last 24 Hrs  T(C): 36.7 (31 Jan 2023 15:45), Max: 36.7 (31 Jan 2023 15:45)  T(F): 98 (31 Jan 2023 15:45), Max: 98 (31 Jan 2023 15:45)  HR: 68 (31 Jan 2023 15:45) (54 - 89)  BP: 147/64 (31 Jan 2023 15:45) (96/74 - 147/64)  BP(mean): --  RR: 18 (31 Jan 2023 15:45) (15 - 24)  SpO2: 98% (31 Jan 2023 15:45) (96% - 100%)    Parameters below as of 31 Jan 2023 15:45  Patient On (Oxygen Delivery Method): room air        PAIN SCORE:  1       SCALE USED: (1-10 VNRS)        Affected extremity:          Dressing: clean/dry/intact of a Mathews dressing is clean with a cryocuff over the right knee. Supple right thigh.           Sensation:  sensation to light touch intact. PAS present to both legs         Motor exam:          5/ 5 Tibialis Anterior/Gastrocnemius-Soleus , EHL         warm well perfused; capillary refill <3 seconds     LABS: labs not yet drawn      MEDICATIONS:    Anticoagulation: Eliquis with a caprini score of 10      Antibiotics:   ceFAZolin   IVPB 2000 milliGRAM(s) IV Intermittent every 8 hours      Pain medications:   acetaminophen     Tablet .. 1000 milliGRAM(s) Oral every 8 hours  acetaminophen   IVPB .. 1000 milliGRAM(s) IV Intermittent once  celecoxib 200 milliGRAM(s) Oral every 12 hours  HYDROmorphone  Injectable 0.25 milliGRAM(s) IV Push every 10 minutes PRN  HYDROmorphone  Injectable 0.5 milliGRAM(s) IV Push every 10 minutes PRN  HYDROmorphone  Injectable 0.5 milliGRAM(s) IV Push every 3 hours PRN  ondansetron Injectable 4 milliGRAM(s) IV Push every 6 hours PRN  ondansetron Injectable 4 milliGRAM(s) IV Push once PRN  oxyCODONE    IR 5 milliGRAM(s) Oral every 3 hours PRN  oxyCODONE    IR 10 milliGRAM(s) Oral every 3 hours PRN      A/P :  s/p Right TKR POD # 0  -    Pain control  -    DVT ppx: Eliquis   -    Check AM labs  -    Weight bearing status: WBAT   -    Physical Therapy  -    Dispo: Home  PAST SURGICAL HISTORY:  No significant past surgical history Statement Selected PAST SURGICAL HISTORY:  Elevated PSA prostate spacer placement 6/2022    H/O prostate biopsy transperineal fusion prostate 10/2021

## 2023-03-27 ENCOUNTER — LABORATORY RESULT (OUTPATIENT)
Age: 78
End: 2023-03-27

## 2023-03-27 ENCOUNTER — APPOINTMENT (OUTPATIENT)
Dept: CARDIOLOGY | Facility: CLINIC | Age: 78
End: 2023-03-27
Payer: MEDICARE

## 2023-03-27 ENCOUNTER — NON-APPOINTMENT (OUTPATIENT)
Age: 78
End: 2023-03-27

## 2023-03-27 ENCOUNTER — APPOINTMENT (OUTPATIENT)
Dept: PULMONOLOGY | Facility: CLINIC | Age: 78
End: 2023-03-27

## 2023-03-27 ENCOUNTER — APPOINTMENT (OUTPATIENT)
Dept: PULMONOLOGY | Facility: CLINIC | Age: 78
End: 2023-03-27
Payer: MEDICARE

## 2023-03-27 VITALS
DIASTOLIC BLOOD PRESSURE: 68 MMHG | HEART RATE: 70 BPM | OXYGEN SATURATION: 96 % | TEMPERATURE: 98.2 F | WEIGHT: 146 LBS | SYSTOLIC BLOOD PRESSURE: 132 MMHG | HEIGHT: 68 IN | BODY MASS INDEX: 22.13 KG/M2

## 2023-03-27 DIAGNOSIS — F17.200 NICOTINE DEPENDENCE, UNSPECIFIED, UNCOMPLICATED: ICD-10-CM

## 2023-03-27 DIAGNOSIS — R35.0 FREQUENCY OF MICTURITION: ICD-10-CM

## 2023-03-27 PROCEDURE — 71046 X-RAY EXAM CHEST 2 VIEWS: CPT

## 2023-03-27 PROCEDURE — 99214 OFFICE O/P EST MOD 30 MIN: CPT | Mod: 25

## 2023-03-27 PROCEDURE — 93000 ELECTROCARDIOGRAM COMPLETE: CPT

## 2023-03-27 RX ORDER — METHYLPREDNISOLONE 4 MG/1
4 TABLET ORAL
Qty: 1 | Refills: 0 | Status: ACTIVE | COMMUNITY
Start: 2023-03-27 | End: 1900-01-01

## 2023-03-27 RX ORDER — DOXYCYCLINE 100 MG/1
100 TABLET, FILM COATED ORAL
Qty: 20 | Refills: 0 | Status: ACTIVE | COMMUNITY
Start: 2023-03-27 | End: 1900-01-01

## 2023-03-27 NOTE — HISTORY OF PRESENT ILLNESS
[FreeTextEntry1] : This is a 78 y/o male with a pmhx of anemia, HTN, prostate ca s/p radiation s/p seeds who presents to the office for routine follow up. Patient with worsening cough x 2 weeks, no fever, chills, sore throat. Reports he feels like he has a "cold in the chest". Patient still with some dyspnea with exertion, stable. Denies chest pain. \par

## 2023-03-28 DIAGNOSIS — Z00.00 ENCOUNTER FOR GENERAL ADULT MEDICAL EXAMINATION W/OUT ABNORMAL FINDINGS: ICD-10-CM

## 2023-03-28 LAB
25(OH)D3 SERPL-MCNC: 18.1 NG/ML
ALBUMIN SERPL ELPH-MCNC: 4.1 G/DL
ALP BLD-CCNC: 130 U/L
ALT SERPL-CCNC: 16 U/L
ANION GAP SERPL CALC-SCNC: 13 MMOL/L
AST SERPL-CCNC: 23 U/L
BASOPHILS # BLD AUTO: 0.15 K/UL
BASOPHILS NFR BLD AUTO: 3.5 %
BILIRUB DIRECT SERPL-MCNC: 0 MG/DL
BILIRUB INDIRECT SERPL-MCNC: 0.2 MG/DL
BILIRUB SERPL-MCNC: 0.2 MG/DL
BUN SERPL-MCNC: 17 MG/DL
CALCIUM SERPL-MCNC: 9.2 MG/DL
CHLORIDE SERPL-SCNC: 107 MMOL/L
CHOLEST SERPL-MCNC: 173 MG/DL
CO2 SERPL-SCNC: 21 MMOL/L
CREAT SERPL-MCNC: 1.11 MG/DL
EGFR: 68 ML/MIN/1.73M2
EOSINOPHIL # BLD AUTO: 0.19 K/UL
EOSINOPHIL NFR BLD AUTO: 4.3 %
ESTIMATED AVERAGE GLUCOSE: 114 MG/DL
FERRITIN SERPL-MCNC: 120 NG/ML
FOLATE SERPL-MCNC: 11.8 NG/ML
GLUCOSE SERPL-MCNC: 92 MG/DL
HBA1C MFR BLD HPLC: 5.6 %
HCT VFR BLD CALC: 41 %
HDLC SERPL-MCNC: 76 MG/DL
HGB BLD-MCNC: 13.2 G/DL
HMPV RNA SPEC QL NAA+PROBE: DETECTED
IRON SATN MFR SERPL: 25 %
IRON SERPL-MCNC: 69 UG/DL
LDLC SERPL CALC-MCNC: 79 MG/DL
LYMPHOCYTES # BLD AUTO: 0.27 K/UL
LYMPHOCYTES NFR BLD AUTO: 6.1 %
MAN DIFF?: NORMAL
MCHC RBC-ENTMCNC: 30.8 PG
MCHC RBC-ENTMCNC: 32.2 GM/DL
MCV RBC AUTO: 95.6 FL
MONOCYTES # BLD AUTO: 0.27 K/UL
MONOCYTES NFR BLD AUTO: 6.1 %
NEUTROPHILS # BLD AUTO: 3.5 K/UL
NEUTROPHILS NFR BLD AUTO: 80 %
NONHDLC SERPL-MCNC: 97 MG/DL
PLATELET # BLD AUTO: 210 K/UL
POTASSIUM SERPL-SCNC: 4.2 MMOL/L
PROT SERPL-MCNC: 7.3 G/DL
RAPID RVP RESULT: DETECTED
RBC # BLD: 4.29 M/UL
RBC # FLD: 13.9 %
SARS-COV-2 RNA PNL RESP NAA+PROBE: NOT DETECTED
SODIUM SERPL-SCNC: 140 MMOL/L
T4 FREE SERPL-MCNC: 1 NG/DL
TIBC SERPL-MCNC: 273 UG/DL
TRIGL SERPL-MCNC: 90 MG/DL
TSH SERPL-ACNC: 0.2 UIU/ML
UIBC SERPL-MCNC: 204 UG/DL
VIT B12 SERPL-MCNC: 1685 PG/ML
WBC # FLD AUTO: 4.37 K/UL

## 2023-03-28 RX ORDER — ERGOCALCIFEROL 1.25 MG/1
1.25 MG CAPSULE, LIQUID FILLED ORAL
Qty: 6 | Refills: 0 | Status: ACTIVE | COMMUNITY
Start: 2023-03-28 | End: 1900-01-01

## 2023-04-26 ENCOUNTER — APPOINTMENT (OUTPATIENT)
Dept: CT IMAGING | Facility: CLINIC | Age: 78
End: 2023-04-26
Payer: MEDICARE

## 2023-04-26 ENCOUNTER — OUTPATIENT (OUTPATIENT)
Dept: OUTPATIENT SERVICES | Facility: HOSPITAL | Age: 78
LOS: 1 days | End: 2023-04-26
Payer: MEDICARE

## 2023-04-26 DIAGNOSIS — Z00.00 ENCOUNTER FOR GENERAL ADULT MEDICAL EXAMINATION WITHOUT ABNORMAL FINDINGS: ICD-10-CM

## 2023-04-26 DIAGNOSIS — R06.09 OTHER FORMS OF DYSPNEA: ICD-10-CM

## 2023-04-26 DIAGNOSIS — Z98.890 OTHER SPECIFIED POSTPROCEDURAL STATES: Chronic | ICD-10-CM

## 2023-04-26 DIAGNOSIS — R97.20 ELEVATED PROSTATE SPECIFIC ANTIGEN [PSA]: Chronic | ICD-10-CM

## 2023-04-26 PROCEDURE — 71250 CT THORAX DX C-: CPT | Mod: 26

## 2023-04-26 PROCEDURE — 71250 CT THORAX DX C-: CPT

## 2023-04-28 ENCOUNTER — APPOINTMENT (OUTPATIENT)
Dept: UROLOGY | Facility: CLINIC | Age: 78
End: 2023-04-28
Payer: MEDICARE

## 2023-04-28 VITALS
HEART RATE: 85 BPM | SYSTOLIC BLOOD PRESSURE: 135 MMHG | DIASTOLIC BLOOD PRESSURE: 82 MMHG | OXYGEN SATURATION: 96 % | RESPIRATION RATE: 16 BRPM

## 2023-04-28 PROCEDURE — 99214 OFFICE O/P EST MOD 30 MIN: CPT | Mod: 25

## 2023-04-28 PROCEDURE — 96402 CHEMO HORMON ANTINEOPL SQ/IM: CPT

## 2023-04-28 RX ORDER — LEUPROLIDE ACETATE 30 MG/.5ML
30 INJECTION, SUSPENSION, EXTENDED RELEASE SUBCUTANEOUS
Refills: 0 | Status: COMPLETED | OUTPATIENT
Start: 2023-04-28

## 2023-04-28 RX ORDER — DIAZEPAM 5 MG/1
5 TABLET ORAL
Qty: 1 | Refills: 0 | Status: DISCONTINUED | COMMUNITY
Start: 2022-05-12 | End: 2023-04-28

## 2023-04-28 RX ORDER — AMOXICILLIN AND CLAVULANATE POTASSIUM 875; 125 MG/1; MG/1
875-125 TABLET, COATED ORAL
Qty: 6 | Refills: 0 | Status: DISCONTINUED | COMMUNITY
Start: 2022-05-12 | End: 2023-04-28

## 2023-04-28 RX ORDER — AMOXICILLIN AND CLAVULANATE POTASSIUM 875; 125 MG/1; MG/1
875-125 TABLET, COATED ORAL
Qty: 6 | Refills: 0 | Status: DISCONTINUED | COMMUNITY
Start: 2022-05-11 | End: 2023-04-28

## 2023-04-28 RX ORDER — LEUPROLIDE ACETATE 30 MG/.5ML
30 INJECTION, SUSPENSION, EXTENDED RELEASE SUBCUTANEOUS
Qty: 1 | Refills: 0 | Status: ACTIVE | COMMUNITY
Start: 2023-04-28

## 2023-04-28 RX ORDER — DIAZEPAM 5 MG/1
5 TABLET ORAL
Qty: 1 | Refills: 0 | Status: DISCONTINUED | COMMUNITY
Start: 2022-05-11 | End: 2023-04-28

## 2023-04-28 RX ADMIN — LEUPROLIDE ACETATE 0 MG: KIT SUBCUTANEOUS at 00:00

## 2023-04-28 NOTE — PHYSICAL EXAM
[Urethral Meatus] : meatus normal [Penis Abnormality] : normal uncircumcised penis [Urinary Bladder Findings] : the bladder was normal on palpation [Scrotum] : the scrotum was normal [Testes Mass (___cm)] : there were no testicular masses [Prostate Tenderness] : the prostate was not tender [General Appearance - Well Developed] : well developed [General Appearance - Well Nourished] : well nourished [Normal Appearance] : normal appearance [Well Groomed] : well groomed [General Appearance - In No Acute Distress] : no acute distress [Abdomen Soft] : soft [Abdomen Tenderness] : non-tender [Costovertebral Angle Tenderness] : no ~M costovertebral angle tenderness [] : no respiratory distress [Respiration, Rhythm And Depth] : normal respiratory rhythm and effort [Exaggerated Use Of Accessory Muscles For Inspiration] : no accessory muscle use [Normal Station and Gait] : the gait and station were normal for the patient's age [FreeTextEntry1] : Penile vitiligo, nodular prostate on ANGÉLICA previously

## 2023-04-28 NOTE — HISTORY OF PRESENT ILLNESS
[FreeTextEntry1] : He is a 78 year-old man who is seen today with family member for prostate cancer and urinary symptoms.  He is receiving the last Eligard injection to complete 2 years of androgen deprivation therapy which started in November 2021.  He is not complaining of hot flashes.  He is using tamsulosin once a day but he feels that urination is slower and more frequent.  He was on it twice a day in the past.  PSA level was 0.02 in December 2022.  He has been given sildenafil in the past for erectile dysfunction but does not seem that he has been using it.  Residual urine volume today was 130 mL.\par \par He finished radiation therapy plus brachytherapy in September 2022.  The initial PSA level was 33.  \par \par Previous notes: Bone scan did not show any evidence of metastasis and MRI of the abdomen did not show any evidence of metastasis either. \par \par MRI showed 32 g prostate and three suspicious lesions PI-RADS 4.  There was no obvious extra prostatic or seminal vesicle invasion or pelvic adenopathy.  Fusion biopsy of the prostate in November 2021 showed high-volume [place] 8 and [place] 7 prostate cancer from target and random prostate biopsy in most biopsied areas.

## 2023-04-28 NOTE — ASSESSMENT
[FreeTextEntry1] : His last Eligard 30 mg injection was given on the right side.  He is supposed to have a PSA level done soon.  Continue vitamin D, calcium and weightbearing exercises.  He could increase tamsulosin to twice a day to see if urinary symptoms improve.  He will follow-up in 4 to 6 months.  Also he has sildenafil in case he decides to use it.  He will get back to me with his response.\par \par Omer Holland MD, FACS\par [facility]\par  of Urology\par \par [address], [address]\[place] [address]\par \par [address], [address]\[place] [address]\par \par Tel: [phone]\par Fax: [phone]

## 2023-05-04 ENCOUNTER — TRANSCRIPTION ENCOUNTER (OUTPATIENT)
Age: 78
End: 2023-05-04

## 2023-06-01 ENCOUNTER — NON-APPOINTMENT (OUTPATIENT)
Age: 78
End: 2023-06-01

## 2023-06-09 DIAGNOSIS — E55.9 VITAMIN D DEFICIENCY, UNSPECIFIED: ICD-10-CM

## 2023-06-09 RX ORDER — ADHESIVE TAPE 3"X 2.3 YD
50 MCG TAPE, NON-MEDICATED TOPICAL
Qty: 90 | Refills: 0 | Status: ACTIVE | COMMUNITY
Start: 2023-06-09 | End: 1900-01-01

## 2023-06-13 ENCOUNTER — LABORATORY RESULT (OUTPATIENT)
Age: 78
End: 2023-06-13

## 2023-06-14 LAB
APPEARANCE: CLEAR
BILIRUBIN URINE: NEGATIVE
BLOOD URINE: NEGATIVE
COLOR: YELLOW
GLUCOSE QUALITATIVE U: NEGATIVE MG/DL
KETONES URINE: NEGATIVE MG/DL
LEUKOCYTE ESTERASE URINE: ABNORMAL
NITRITE URINE: NEGATIVE
PH URINE: 6.5
PROTEIN URINE: NEGATIVE MG/DL
PSA SERPL-MCNC: <0.01 NG/ML
SPECIFIC GRAVITY URINE: 1.01
UROBILINOGEN URINE: 1 MG/DL

## 2023-06-15 ENCOUNTER — APPOINTMENT (OUTPATIENT)
Dept: PULMONOLOGY | Facility: CLINIC | Age: 78
End: 2023-06-15
Payer: MEDICARE

## 2023-06-15 ENCOUNTER — NON-APPOINTMENT (OUTPATIENT)
Age: 78
End: 2023-06-15

## 2023-06-15 VITALS
OXYGEN SATURATION: 100 % | DIASTOLIC BLOOD PRESSURE: 83 MMHG | RESPIRATION RATE: 16 BRPM | WEIGHT: 146 LBS | HEIGHT: 68 IN | SYSTOLIC BLOOD PRESSURE: 131 MMHG | HEART RATE: 91 BPM | BODY MASS INDEX: 22.13 KG/M2

## 2023-06-15 LAB — BACTERIA UR CULT: NORMAL

## 2023-06-15 PROCEDURE — 94727 GAS DIL/WSHOT DETER LNG VOL: CPT

## 2023-06-15 PROCEDURE — 94729 DIFFUSING CAPACITY: CPT

## 2023-06-15 PROCEDURE — ZZZZZ: CPT

## 2023-06-15 PROCEDURE — 94010 BREATHING CAPACITY TEST: CPT

## 2023-06-15 PROCEDURE — 99205 OFFICE O/P NEW HI 60 MIN: CPT | Mod: 25

## 2023-06-15 PROCEDURE — 94618 PULMONARY STRESS TESTING: CPT

## 2023-06-15 NOTE — REASON FOR VISIT
[Initial] : an initial visit [Abnormal CXR/ Chest CT] : an abnormal CXR/ chest CT [Wheezing] : wheezing [TextBox_44] : Pleural plaque , exertional dyspnea

## 2023-06-15 NOTE — PROCEDURE
[FreeTextEntry1] : PFT Federica 15, 2023\par Mild restrictive ventilatory impairment\par TLC 75% predicted\par Positive air-trapping with RV/TLC ratio 130% predicted\par Diffusion 63% predicted with a mild to moderate loss of functioning alveolar Units\par Hemoglobin 13.2\par \par CT chest April 26, 2023\par Predominantly left-sided calcified pleural plaque\par Bilateral lower lobe linear atelectatic scar\par No dominant pulmonary nodules\par Bhavya mediastinum unremarkable\par No bony lesions\par Impression calcified pleural plaque\par Clinical correlation\par \par Pulmonary 6 normal exercise study Federica 15, 2023\par Baseline O2 saturation 97%\par Impression normal study\par No room air desaturation

## 2023-06-15 NOTE — DISCUSSION/SUMMARY
[FreeTextEntry1] : Mild restrictive ventilatory impairment\par Calcified pleural plaques suggestive of prior asbestos exposure\par No clinical findings to suggest interstitial lung disease although asbestos related lung disease can account for the restrictive component\par Component of obstructive airway disease with demonstrated air trapping on the EFT data\par Recommendations\par Former extensive tobacco history with pleural plaques suggestive of prior asbestos exposure makes this patient higher risk for lung cancer\par Therefore yearly chest CT scan recommended\par For the cough occasional wheeze and obstructive airway disease\par Sample Anoro Ellipta trial 1 puff daily with detailed instructions provided\par As needed albuterol with MDI instructions provided\par Office follow-up several weeks to see response to therapy\par \par

## 2023-06-15 NOTE — HISTORY OF PRESENT ILLNESS
[Former] : former [>= 20 pack years] : >= 20 pack years [TextBox_4] : 78-year-old male patient presents with his daughter\par Abnormal CT chest\par Extensive tobacco history likely greater than 15 years discontinued tobacco for approximately 1 year dating back to 2022\par Smokes 1 cigar/day\par Retired \par No clear description of chemical toxic inhalational exposures\par Chief complaint exertional dyspnea\par Chronic cough\par Occasional yellow sputum\par Occasional wheeze with cough\par Symptoms nocturnal component reported by patient's daughter with chest congestion noted\par The exertional dyspnea is predominantly with stair climbing\par No prior history reported asthma pneumonia COPD emphysema interstitial lung disease\par No history reported of an MRI congestive heart failure\par No history of skin lesions such as eczema\par Does not report significant allergy symptomatology\par No reported history of a known COVID-19 infection\par COVID-vaccine Pfizer 2 initial doses +1 booster\par WBC received a pneumonia vaccine at pharmacy.  You should\par  [YearQuit] : 2022 [TextBox_29] : Daily cigar

## 2023-06-15 NOTE — PHYSICAL EXAM
[No Acute Distress] : no acute distress [Normal Oropharynx] : normal oropharynx [II] : Mallampati Class: II [Normal Appearance] : normal appearance [Supple] : supple [No Neck Mass] : no neck mass [Normal Rate/Rhythm] : normal rate/rhythm [No JVD] : no jvd [Normal S1, S2] : normal s1, s2 [No Murmurs] : no murmurs [No Rubs] : no rubs [No Gallops] : no gallops [No Resp Distress] : no resp distress [Normal Rhythm and Effort] : normal rhythm and effort [Normal Palpation] : normal palpation [Wheeze] : wheeze [No Abnormalities] : no abnormalities [Benign] : benign [Not Tender] : not tender [Soft] : soft [Normal Bowel Sounds] : normal bowel sounds [No HSM] : no hsm [Normal Gait] : normal gait [No Clubbing] : no clubbing [No Cyanosis] : no cyanosis [No Edema] : no edema [Normal Color/ Pigmentation] : normal color/ pigmentation [Oriented x3] : oriented x3 [No Focal Deficits] : no focal deficits [Normal Affect] : normal affect [TextBox_68] : Negative inspiratory crackles

## 2023-06-15 NOTE — REVIEW OF SYSTEMS
[Fatigue] : fatigue [Negative] : Dermatologic [Fever] : no fever [Chills] : no chills [Recent Wt Loss (___ Lbs)] : ~T no recent weight loss [Poor Appetite] : no poor appetite [Diabetes] : no diabetes [Thyroid Problem] : no thyroid problem [TextBox_30] : HPI [TextBox_83] : History prostate cancer [TextBox_44] : Hypertension [TextBox_110] : History low B12 level, history of anemia [TextBox_119] : Reported history of an abnormal brain MRI [TextBox_141] : Vitamin D deficiency

## 2023-06-26 ENCOUNTER — APPOINTMENT (OUTPATIENT)
Dept: PULMONOLOGY | Facility: CLINIC | Age: 78
End: 2023-06-26
Payer: MEDICARE

## 2023-06-26 VITALS — OXYGEN SATURATION: 96 % | HEART RATE: 80 BPM | SYSTOLIC BLOOD PRESSURE: 130 MMHG | DIASTOLIC BLOOD PRESSURE: 78 MMHG

## 2023-06-26 PROCEDURE — 94010 BREATHING CAPACITY TEST: CPT

## 2023-06-26 PROCEDURE — 99214 OFFICE O/P EST MOD 30 MIN: CPT | Mod: 25

## 2023-06-26 RX ORDER — FLUTICASONE FUROATE, UMECLIDINIUM BROMIDE AND VILANTEROL TRIFENATATE 200; 62.5; 25 UG/1; UG/1; UG/1
200-62.5-25 POWDER RESPIRATORY (INHALATION)
Qty: 1 | Refills: 5 | Status: ACTIVE | COMMUNITY
Start: 2023-06-26 | End: 1900-01-01

## 2023-06-26 NOTE — DISCUSSION/SUMMARY
[FreeTextEntry1] : Mild restrictive ventilatory impairment\par Calcified pleural plaques suggestive of prior asbestos exposure\par No clinical findings to suggest interstitial lung disease although asbestos related lung disease can account for the restrictive component\par Component of obstructive airway disease with demonstrated air trapping on the PFT data\par Recommendations\par Former extensive tobacco history with pleural plaques suggestive of prior asbestos exposure makes this patient higher risk for lung cancer\par Therefore yearly chest CT scan recommended\par For the cough occasional wheeze and obstructive airway disease\par Sample Anoro Ellipta trial 1 puff daily with detailed instructions provided- Change to Trelegy if covered\par As needed albuterol with MDI instructions provided\par Office follow-up several weeks to see response to therapy\par \par

## 2023-06-26 NOTE — PROCEDURE
[FreeTextEntry1] : Luis 6/26/23\par moderate reduction flow  rates \par pos  decline\par \par PFT Federica 15, 2023\par Mild restrictive ventilatory impairment\par TLC 75% predicted\par Positive air-trapping with RV/TLC ratio 130% predicted\par Diffusion 63% predicted with a mild to moderate loss of functioning alveolar Units\par Hemoglobin 13.2\par \par CT chest April 26, 2023\par Predominantly left-sided calcified pleural plaque\par Bilateral lower lobe linear atelectatic scar\par No dominant pulmonary nodules\par Bhavya mediastinum unremarkable\par No bony lesions\par Impression calcified pleural plaque\par Clinical correlation\par \par Pulmonary 6 normal exercise study Federica 15, 2023\par Baseline O2 saturation 97%\par Impression normal study\par No room air desaturation

## 2023-06-26 NOTE — REVIEW OF SYSTEMS
[Fatigue] : fatigue [Negative] : Dermatologic [Fever] : no fever [Chills] : no chills [Poor Appetite] : no poor appetite [Recent Wt Loss (___ Lbs)] : ~T no recent weight loss [Diabetes] : no diabetes [Thyroid Problem] : no thyroid problem [TextBox_30] : HPI [TextBox_44] : Hypertension [TextBox_83] : History prostate cancer [TextBox_110] : History low B12 level, history of anemia [TextBox_119] : Reported history of an abnormal brain MRI [TextBox_141] : Vitamin D deficiency

## 2023-06-26 NOTE — HISTORY OF PRESENT ILLNESS
[Former] : former [>= 20 pack years] : >= 20 pack years [TextBox_4] : 78-year-old male patient presents with his daughter\par Noted ANORO with nsx interval improvement of BUTLER SOB\par Abnormal CT chest\par Extensive tobacco history likely greater than 15 years discontinued tobacco for approximately 1 year dating back to 2022\par Smokes 1 cigar/day\par Retired \par No clear description of chemical toxic inhalational exposures\par Chief complaint exertional dyspnea\par Chronic cough\par Occasional yellow sputum\par Occasional wheeze with cough\par Symptoms nocturnal component reported by patient's daughter with chest congestion noted\par The exertional dyspnea is predominantly with stair climbing\par No prior history reported asthma pneumonia COPD emphysema interstitial lung disease\par No history reported of an MRI congestive heart failure\par No history of skin lesions such as eczema\par Does not report significant allergy symptomatology\par No reported history of a known COVID-19 infection\par COVID-vaccine Pfizer 2 initial doses +1 booster\par WBC received a pneumonia vaccine at pharmacy.  You should\par  [YearQuit] : 2022 [TextBox_29] : Daily cigar

## 2023-06-26 NOTE — PHYSICAL EXAM
[No Acute Distress] : no acute distress [Normal Oropharynx] : normal oropharynx [II] : Mallampati Class: II [Normal Appearance] : normal appearance [Supple] : supple [No Neck Mass] : no neck mass [No JVD] : no jvd [Normal Rate/Rhythm] : normal rate/rhythm [Normal S1, S2] : normal s1, s2 [No Murmurs] : no murmurs [No Rubs] : no rubs [No Gallops] : no gallops [No Resp Distress] : no resp distress [Normal Palpation] : normal palpation [Normal Rhythm and Effort] : normal rhythm and effort [Wheeze] : wheeze [No Abnormalities] : no abnormalities [Benign] : benign [Not Tender] : not tender [Soft] : soft [No HSM] : no hsm [Normal Bowel Sounds] : normal bowel sounds [Normal Gait] : normal gait [No Clubbing] : no clubbing [No Cyanosis] : no cyanosis [No Edema] : no edema [Normal Color/ Pigmentation] : normal color/ pigmentation [No Focal Deficits] : no focal deficits [Oriented x3] : oriented x3 [Normal Affect] : normal affect [TextBox_68] : Negative inspiratory crackles

## 2023-06-26 NOTE — REASON FOR VISIT
[Follow-Up] : a follow-up visit [Abnormal CXR/ Chest CT] : an abnormal CXR/ chest CT [Wheezing] : wheezing [TextBox_44] : Pleural plaque , exertional dyspnea

## 2023-07-24 ENCOUNTER — APPOINTMENT (OUTPATIENT)
Dept: PULMONOLOGY | Facility: CLINIC | Age: 78
End: 2023-07-24
Payer: MEDICARE

## 2023-07-24 VITALS
BODY MASS INDEX: 22.13 KG/M2 | HEART RATE: 84 BPM | OXYGEN SATURATION: 96 % | HEIGHT: 68 IN | SYSTOLIC BLOOD PRESSURE: 110 MMHG | WEIGHT: 146 LBS | DIASTOLIC BLOOD PRESSURE: 72 MMHG

## 2023-07-24 PROCEDURE — 99214 OFFICE O/P EST MOD 30 MIN: CPT | Mod: 25

## 2023-07-24 PROCEDURE — 94060 EVALUATION OF WHEEZING: CPT

## 2023-07-24 NOTE — PROCEDURE
[FreeTextEntry1] : 3 spirometry July 23, 2023\par Moderate reduction in flow rates with obstructive component with ratio 73\par No bronchodilator response at FEV1\par 80% response to bronchodilator in small airways\par There is mild interval improvement\par \par \par Luis 6/26/23\par moderate reduction flow  rates \par pos  decline\par \par PFT Federica 15, 2023\par Mild restrictive ventilatory impairment\par TLC 75% predicted\par Positive air-trapping with RV/TLC ratio 130% predicted\par Diffusion 63% predicted with a mild to moderate loss of functioning alveolar Units\par Hemoglobin 13.2\par \par CT chest April 26, 2023\par Predominantly left-sided calcified pleural plaque\par Bilateral lower lobe linear atelectatic scar\par No dominant pulmonary nodules\par Bhavya mediastinum unremarkable\par No bony lesions\par Impression calcified pleural plaque\par Clinical correlation\par \par Pulmonary 6 normal exercise study Federica 15, 2023\par Baseline O2 saturation 97%\par Impression normal study\par No room air desaturation

## 2023-07-24 NOTE — DISCUSSION/SUMMARY
Doctor's Note


Notes: 





10/09/20 22:15


I was asked to evaluate the patient with the CELENA.  He  woke up and had a foreign

body sensation in his right eye.  States like he feels there is something in his

eye.  Patient denies any trauma.  He does not wear contact lenses or glasses.





He is alert, oriented, no acute distress.  He is speaking in full sentences and 

has non labored breathing.  Patient had no obvious foreign body on exam.  Lid 

was everted and no foreign body identified.  He did have a likely corneal 

abrasion with fluorescein uptake.  He will be prescribed erythromycin.  He was 

instructed to follow-up with his PCP and ophthalmology.  Strict return 

precautions provided.


10/09/20 22:19
[FreeTextEntry1] : Mild restrictive ventilatory impairment\par Calcified pleural plaques suggestive of prior asbestos exposure\par No clinical findings to suggest interstitial lung disease although asbestos related lung disease can account for the restrictive component\par Component of obstructive airway disease with demonstrated air trapping on the PFT data\par Recommendations\par Former extensive tobacco history with pleural plaques suggestive of prior asbestos exposure makes this patient higher risk for lung cancer\par Therefore yearly chest CT scan recommended\par For the cough occasional wheeze and obstructive airway disease\par Sample Anoro Ellipta trial 1 puff daily with detailed instructions provided- Change to Trelegy if covered\par As needed albuterol with MDI instructions provided\par Office follow-up several weeks to see response to therapy\par Addendum\par As per patient's daughter he still does a significant smoke cigarettes\par \par She does not believe he was using the Trelegy Ellipta which was present prescribed to  from the pharmacy\par We will provide samples of Trelegy Ellipta 100 mcg so she has it to administer to see if it does improve his exertional symptomatology\par \par \par CAT scan reviewed with patient\par \par 
[FreeTextEntry1] : Mild restrictive ventilatory impairment\par Calcified pleural plaques suggestive of prior asbestos exposure\par No clinical findings to suggest interstitial lung disease although asbestos related lung disease can account for the restrictive component\par Component of obstructive airway disease with demonstrated air trapping on the PFT data\par Recommendations\par Former extensive tobacco history with pleural plaques suggestive of prior asbestos exposure makes this patient higher risk for lung cancer\par Therefore yearly chest CT scan recommended\par For the cough occasional wheeze and obstructive airway disease\par Sample Anoro Ellipta trial 1 puff daily with detailed instructions provided- Change to Trelegy if covered\par As needed albuterol with MDI instructions provided\par Office follow-up several weeks to see response to therapy\par Addendum\par As per patient's daughter he still does a significant smoke cigarettes\par \par She does not believe he was using the Trelegy Ellipta which was present prescribed to  from the pharmacy\par We will provide samples of Trelegy Ellipta 100 mcg so she has it to administer to see if it does improve his exertional symptomatology\par \par \par CAT scan reviewed with patient\par \par 
FEVER

## 2023-07-24 NOTE — HISTORY OF PRESENT ILLNESS
[Former] : former [>= 20 pack years] : >= 20 pack years [TextBox_4] : 78-year-old male patient presents with his daughter\par inc GERD sxs better lying on left side\par  nocturnal cough rattle\par Noted ANORO with nsx interval improvement of BUTLER SOB ? an check if change to Trelegy\par Abnormal CT chest\par Extensive tobacco history likely greater than 15 years discontinued tobacco for approximately 1 year dating back to 2022\par but per  daughter still does  smoke\par Smokes 1 cigar/day\par Retired \par No clear description of chemical toxic inhalational exposures\par Chief complaint exertional dyspnea\par Chronic cough\par Occasional yellow sputum\par Occasional wheeze with cough\par Symptoms nocturnal component reported by patient's daughter with chest congestion noted\par The exertional dyspnea is predominantly with stair climbing\par No prior history reported asthma pneumonia COPD emphysema interstitial lung disease\par No history reported of an MRI congestive heart failure\par No history of skin lesions such as eczema\par Does not report significant allergy symptomatology\par No reported history of a known COVID-19 infection\par COVID-vaccine Pfizer 2 initial doses +1 booster\par WBC received a pneumonia vaccine at pharmacy.  You should\par  [YearQuit] : 2022 [TextBox_29] : Daily cigar

## 2023-07-26 ENCOUNTER — APPOINTMENT (OUTPATIENT)
Dept: RADIATION ONCOLOGY | Facility: CLINIC | Age: 78
End: 2023-07-26
Payer: MEDICARE

## 2023-07-26 ENCOUNTER — NON-APPOINTMENT (OUTPATIENT)
Age: 78
End: 2023-07-26

## 2023-07-26 ENCOUNTER — APPOINTMENT (OUTPATIENT)
Dept: RADIATION ONCOLOGY | Facility: CLINIC | Age: 78
End: 2023-07-26

## 2023-07-26 VITALS
DIASTOLIC BLOOD PRESSURE: 72 MMHG | OXYGEN SATURATION: 98 % | HEART RATE: 76 BPM | WEIGHT: 146 LBS | BODY MASS INDEX: 22.2 KG/M2 | RESPIRATION RATE: 16 BRPM | SYSTOLIC BLOOD PRESSURE: 109 MMHG

## 2023-07-26 DIAGNOSIS — R30.0 DYSURIA: ICD-10-CM

## 2023-07-26 DIAGNOSIS — Z92.3 PERSONAL HISTORY OF IRRADIATION: ICD-10-CM

## 2023-07-26 PROCEDURE — 99214 OFFICE O/P EST MOD 30 MIN: CPT

## 2023-07-30 PROBLEM — R30.0 BURNING WITH URINATION: Status: ACTIVE | Noted: 2023-06-01

## 2023-07-30 PROBLEM — Z92.3 HISTORY OF EXTERNAL BEAM RADIATION THERAPY: Status: ACTIVE | Noted: 2023-07-30

## 2023-07-30 NOTE — PHYSICAL EXAM
[Normal] : oriented to person, place and time, the affect was normal, the mood was normal and not anxious [FreeTextEntry1] : deferred [de-identified] : deferred

## 2023-07-30 NOTE — PHYSICAL EXAM
[Normal] : oriented to person, place and time, the affect was normal, the mood was normal and not anxious [FreeTextEntry1] : deferred [de-identified] : deferred

## 2023-07-30 NOTE — HISTORY OF PRESENT ILLNESS
[FreeTextEntry1] : The patient is a 78 y/o man with modest co-morbidities, diagnosed with High Risk prostate cancer, Eden Score 8 (4+4) with a presenting PSA of 33.4 ng/ml and a prognostic stage of IIIA. He completed definitive radiation therapy to the prostate. He is currently on ADT per Dr. Holland.   s/p prostate seed boost on 9/21/22.  Reports urinary, hesitancy, nocturia X q 2-3hrs, weak stream, fatigue/tired, taking tamsulosin twice daily. Reports changes in bowel movements of constipation. Notes no sexual function which is a big problem for him.  Eligard 30mg SQ  pt smoker and drinking beer daily   PSA 0.01 6/14/2023

## 2023-07-30 NOTE — LETTER GREETING
[Dear Doctor] : Dear Doctor, [Follow-Up] : Your patient, [unfilled] was seen in my office today for follow-up [Please see my note below.] : Please see my note below. [FreeTextEntry2] : Omer Holland

## 2023-07-30 NOTE — LETTER CLOSING
[Sincerely yours,] : Sincerely yours, [FreeTextEntry3] : Jesus Marina MD Physician in Chief Department of Radiation Medicine [facility]   of Radiation Medicine [employer] at  [facility]  Radiation  [facility]/ [facility] at [place] [address]  Tel: [phone] Fax: [phone]

## 2023-07-30 NOTE — HISTORY OF PRESENT ILLNESS
[FreeTextEntry1] : The patient is a 76 y/o man with modest co-morbidities, diagnosed with High Risk prostate cancer, Eden Score 8 (4+4) with a presenting PSA of 33.4 ng/ml and a prognostic stage of IIIA. He completed definitive radiation therapy to the prostate. He is currently on ADT per Dr. Holland.   s/p prostate seed boost on 9/21/22.  Reports urinary, hesitancy, nocturia X q 2-3hrs, weak stream, fatigue/tired, taking tamsulosin twice daily. Reports changes in bowel movements of constipation. Notes no sexual function which is a big problem for him.  Eligard 30mg SQ  pt smoker and drinking beer daily   PSA 0.01 6/14/2023

## 2023-07-30 NOTE — VITALS
[Maximal Pain Intensity: 3/10] : 3/10 [Least Pain Intensity: 0/10] : 0/10 [80: Normal activity with effort; some signs or symptoms of disease.] : 80: Normal activity with effort; some signs or symptoms of disease.  [2 - Distress Level] : Distress Level: 2 [Patient given social work contact information and resource sheet] : Patient was given social work contact information and resource sheet [Pain Duration: ___] : Pain duration: [unfilled] [NSAID/Non-Opioid] : NSAID/Non-Opioid [ECOG Performance Status: 1 - Restricted in physically strenuous activity but ambulatory and able to carry out work of a light or sedentary nature] : Performance Status: 1 - Restricted in physically strenuous activity but ambulatory and able to carry out work of a light or sedentary nature, e.g., light house work, office work

## 2023-07-30 NOTE — ASSESSMENT
[No evidence of disease] : No evidence of disease [FreeTextEntry1] : moderate persistent treatment related sequelae.

## 2023-07-30 NOTE — ASU PREOP CHECKLIST - INTERNAL PROSTHESES
July 30, 2023     Patient: Jen Jean Baptiste   YOB: 1997   Date of Visit: 7/30/2023       To Whom it May Concern:    Jen Jean Baptiste was seen in my clinic on 7/30/2023 at 9:40 am.    Please excuse Jen for her absence from work on the date listed above to be able to make her appointment. Please excuse her from work from 7- until 8-2-2023. She may return to work on 8-3-2023.    Sincerely,         Kishore Oro MD    Medical information is confidential and cannot be disclosed without the written consent of the patient or her representative.    
no

## 2023-07-30 NOTE — DISEASE MANAGEMENT
[1] : T1 [c] : c [0] : M0 [>20] : >20 ng/mL [Biopsy] : Patient had a biopsy on [8] : Template Biopsy Eden Score: 8 [IIIA] : IIIA [Radiation Therapy] : Radiation Therapy [Treatment with radiation therapy] : Treatment with radiation therapy [EBRT] : EBRT [LDR] : LDR [EBRTDose] : 4500cGy [EBRTFractions] : 25 [LDRDose] : 10,000cGy [LDRFractions] : 1

## 2023-08-09 ENCOUNTER — APPOINTMENT (OUTPATIENT)
Dept: CARDIOLOGY | Facility: CLINIC | Age: 78
End: 2023-08-09
Payer: MEDICARE

## 2023-08-09 VITALS
HEART RATE: 69 BPM | OXYGEN SATURATION: 99 % | DIASTOLIC BLOOD PRESSURE: 80 MMHG | HEIGHT: 68 IN | BODY MASS INDEX: 22.5 KG/M2 | SYSTOLIC BLOOD PRESSURE: 140 MMHG | WEIGHT: 148.44 LBS | TEMPERATURE: 97.7 F

## 2023-08-09 DIAGNOSIS — R25.1 TREMOR, UNSPECIFIED: ICD-10-CM

## 2023-08-09 DIAGNOSIS — R35.1 NOCTURIA: ICD-10-CM

## 2023-08-09 PROCEDURE — 99214 OFFICE O/P EST MOD 30 MIN: CPT | Mod: 25

## 2023-08-09 PROCEDURE — 93000 ELECTROCARDIOGRAM COMPLETE: CPT

## 2023-08-09 RX ORDER — AMLODIPINE BESYLATE 5 MG/1
5 TABLET ORAL
Qty: 1 | Refills: 3 | Status: ACTIVE | COMMUNITY
Start: 2020-10-30 | End: 1900-01-01

## 2023-08-09 RX ORDER — LOSARTAN POTASSIUM 50 MG/1
50 TABLET, FILM COATED ORAL
Qty: 90 | Refills: 3 | Status: ACTIVE | COMMUNITY
Start: 2021-08-19 | End: 1900-01-01

## 2023-08-09 NOTE — HISTORY OF PRESENT ILLNESS
[FreeTextEntry1] : This is a 79 y/o male with a pmhx of anemia, HTN, prostate ca s/p radiation s/p seeds who presents to the office for routine follow up. pt reports trouble falling asleep due to nocturia.. pt also reports RUE tremor  reports fatigue Denies any chest pain or sob. fever chills n/v/d pt also with depression feels has no energy .hpiihtnlippt with fatigue

## 2023-08-24 ENCOUNTER — APPOINTMENT (OUTPATIENT)
Dept: ORTHOPEDIC SURGERY | Facility: CLINIC | Age: 78
End: 2023-08-24
Payer: MEDICARE

## 2023-08-24 VITALS
HEART RATE: 85 BPM | BODY MASS INDEX: 22.43 KG/M2 | DIASTOLIC BLOOD PRESSURE: 74 MMHG | HEIGHT: 68 IN | SYSTOLIC BLOOD PRESSURE: 117 MMHG | WEIGHT: 148 LBS

## 2023-08-24 DIAGNOSIS — M25.512 PAIN IN LEFT SHOULDER: ICD-10-CM

## 2023-08-24 PROCEDURE — 99213 OFFICE O/P EST LOW 20 MIN: CPT

## 2023-08-24 RX ORDER — MELOXICAM 15 MG/1
15 TABLET ORAL
Qty: 30 | Refills: 0 | Status: ACTIVE | COMMUNITY
Start: 2023-08-24 | End: 1900-01-01

## 2023-08-24 NOTE — PHYSICAL EXAM
[de-identified] : General: Awake, alert, no acute distress, Patient was cooperative and appropriate during the examination.  The patient is of normal weight for height and age.  Walks without an antalgic gait.  Full, painless range of motion of the neck and back.  Exam of the bilateral lower extremities is intact and symmetric with regards to dermatologic, vascular, and neurologic exam. Bilateral lower extremity sensation is grossly intact to light touch in the DP/SP/T/S/S nerve distributions. Intact DF/PF/EHL. BIlateral lower extremities warm and well-perfused with brisk capillary refill.   Pulmonary: Regular, nonlabored breathing  Abdomen: Soft, nontender, nondistended.  Lymphatic: No evidence of axillary lymphadenopathy  Left shoulder Exam: Physical exam of the shoulder demonstrates normal skin without signs of skin changes or abnormalities. No erythema, warmth, or joint effusion appreciated.     Sensation intact light touch C5-T1 Palpable radial pulse Radial/ulnar/median/axillary/musculocutaneous/AIN/PIN nerves grossly intact   Range of motion: Forward Flexion: 180 Abduction: 150 External Rotation: 60 Internal Rotation: T7   Palpation: Not tender to palpation over the glenohumeral joint Mildly tender palpation over the rotator cuff insertion on the greater tuberosity Not tender to palpation over the AC joint Mildly tender to palpation of the biceps tendon/bicipital groove   Strength testing: Supraspinatus: 5/5 Infraspinatus: 5/5 Subscapularis: 5/5   Special test: Empty can test mildly positive Rios impingement test negative Speeds test negative Kiel's test negative Lift-off test negative Bell-press test negative Cross-arm adduction test negative  [de-identified] : X-ray 4 views of the left shoulder taken in the office today on 12/13/2022 reveals no acute fracture or dislocation.

## 2023-08-24 NOTE — CONSULT LETTER
[FreeTextEntry2] : Doesnt have PCP\par   [FreeTextEntry3] : Nahid Escobar DO.\par  Sports Medicine \par  Orthopaedic Surgery\par  \par  [facility] \par  [address]\[place] [address]\par  \par  Tel [phone]\par  Fax [phone]\par  \par  For same day and next day orthopedic appointments contact:\par  [email or web address] |[phone])\par  Appointments available nights and weekends!  \par  \par  [facility]\par  Visit us at [facility]/orthopaedic\par

## 2023-08-24 NOTE — REASON FOR VISIT
[Family Member] : family member [Initial Visit] : an initial visit for [FreeTextEntry2] : Left shoulder pain

## 2023-08-24 NOTE — DISCUSSION/SUMMARY
[de-identified] : Assessment: 77-year-old male with left shoulder rotator cuff tendinitis, bursitis  Plan: I had a long discussion with the patient today regarding the nature of their diagnosis and treatment plan. We discussed the risks and benefits of no treatment as well as nonoperative and operative treatments.  I reviewed the x-rays with the patient today that revealed no acute bony pathology.  At this time I am recommending continued conservative treatment including ice, heat, rest, Mobic 15 mg once daily with food for pain and inflammation, physical therapy for symptomatic relief.  GI precautions were discussed and prescriptions were provided today.  He was offered an injection today but declined and will follow-up in 6 to 8 weeks for repeat evaluation.  If symptoms were to persist despite conservative treatment we did discuss potential injection versus MRI.  Patient discussed and reviewed with Dr. Escobar today.   The patient verbalizes their understanding and agrees with the plan.  All questions were answered to their satisfaction.

## 2023-08-24 NOTE — HISTORY OF PRESENT ILLNESS
[Pain Location] : pain [] : left shoulder [Worsening] : worsening [Intermit.] : ~He/She~ states the symptoms seem to be intermittent [Direct Pressure] : worsened by direct pressure [Lifting] : worsened by lifting [Rest] : relieved by rest [de-identified] : 08/24/2023 : MAIDA ADAN  is a 78 year  old male who presents to the office for follow-up evaluation of his left shoulder.  He states that since the last office visit he has had continued pain in the shoulder that is only bothersome with certain movements and activities and alleviated with rest.  He states he has not taken any medication or done any physical therapy nor had any treatment for his shoulder before but the pain persists with certain movements.  He states is only minimally bothersome and only aggravated when he puts his arm in certain positions and is alleviated with rest and he is here for repeat evaluation today because of the continued pain.  He denies any numbness or tingling distally.  He denies any new injury.  12/13/2022 : MAIDA ADAN  is a 77 year  old male who presents to the office for evaluation of his left shoulder.  He states a couple months ago he had a flu vaccine into his left shoulder and since then has had worsening pain.  He states today is the first day the pain is feeling better.  He states prior to the flu vaccine he never had any pain in his shoulder but recently over the last couple months has had significant pain.  He has not taken any medication nor done any physical therapy nor ever seen a doctor for this before but due to his continued pain he is here for specialist opinion today.  He denies any numbness or tingling distally.  He has no other complaints today.  He localized the pain over the lateral and anterior aspect of the shoulder.

## 2023-08-25 ENCOUNTER — APPOINTMENT (OUTPATIENT)
Dept: UROLOGY | Facility: CLINIC | Age: 78
End: 2023-08-25
Payer: MEDICARE

## 2023-08-25 DIAGNOSIS — R39.15 URGENCY OF URINATION: ICD-10-CM

## 2023-08-25 PROCEDURE — 99214 OFFICE O/P EST MOD 30 MIN: CPT

## 2023-08-25 NOTE — PHYSICAL EXAM
[Urethral Meatus] : meatus normal [Penis Abnormality] : normal uncircumcised penis [Urinary Bladder Findings] : the bladder was normal on palpation [Scrotum] : the scrotum was normal [Testes Mass (___cm)] : there were no testicular masses [General Appearance - Well Developed] : well developed [General Appearance - Well Nourished] : well nourished [Normal Appearance] : normal appearance [Well Groomed] : well groomed [General Appearance - In No Acute Distress] : no acute distress [Abdomen Soft] : soft [Abdomen Tenderness] : non-tender [Costovertebral Angle Tenderness] : no ~M costovertebral angle tenderness [FreeTextEntry1] : Penile vitiligo.  ANGÉLICA was done previously. [] : no respiratory distress [Respiration, Rhythm And Depth] : normal respiratory rhythm and effort [Exaggerated Use Of Accessory Muscles For Inspiration] : no accessory muscle use

## 2023-08-25 NOTE — HISTORY OF PRESENT ILLNESS
[FreeTextEntry1] : He is a 78 year-old man who is seen today with family member for prostate cancer and urinary symptoms.  When he increase his tamsulosin to twice a day urinary flow is better.  However he continues to have urgency and urge incontinence.  Residual urine volume today was 170 mL.  PSA level in June 2023 was undetectable.  He completed 2 years of androgen deprivation therapy which started in November 2021.  He does not have hot flashes now.  He was prescribed sildenafil in the past for erectile dysfunction. He finished radiation therapy plus brachytherapy in September 2022.  The initial PSA level was 33.    Previous notes: Bone scan did not show any evidence of metastasis and MRI of the abdomen did not show any evidence of metastasis either.   MRI showed 32 g prostate and three suspicious lesions PI-RADS 4.  There was no obvious extra prostatic or seminal vesicle invasion or pelvic adenopathy.  Fusion biopsy of the prostate in November 2021 showed high-volume Eden 8 and [place] 7 prostate cancer from target and random prostate biopsy in most biopsied areas.

## 2023-08-25 NOTE — ASSESSMENT
[FreeTextEntry1] : PSA levels have remained very low.  He finished ADT.  Continue vitamin D, calcium and weightbearing exercises.  We had a long discussion regarding patient's urinary symptoms. Initial workup with cystoscopy, determination of urinary flow rate, post void residual volume and urodynamic study was discussed. We discussed conservative management without using medications such as timed voiding, controlling constipation, limiting fluids before bed-time, and limiting irritating substances such as coffee, tea, alcohol, spicy foods, etc. We also discussed medication therapy for treatment of urinary symptoms with anticholinergic medications (such as VESIcare, Toviaz), Myrbetriq, Gemtesa or a combination of the above medications. Side effects of the medications discussed included but were not limited to constipation, dry mouth, change in vision, memory loss, hypertension, etc. Treatment of overactive bladder symptoms with Botox intravesical injections was reviewed. Side effects of Botox were reviewed including urinary retention, need to self-catheterize, UTIs, systemic effects of Botox, etc. Questions were answered.  If he does not respond to Azo bladder control, he will get back to me to try Myrbetriq.  Also may consider cystoscopy.  He will follow-up in a few months.  Omer Holland MD, FACS [facility]  of Urology  [address], [address] [place] [address]  Tel: [phone] Fax: [phone]

## 2023-09-18 ENCOUNTER — APPOINTMENT (OUTPATIENT)
Dept: PULMONOLOGY | Facility: CLINIC | Age: 78
End: 2023-09-18
Payer: MEDICARE

## 2023-09-18 VITALS — OXYGEN SATURATION: 97 % | SYSTOLIC BLOOD PRESSURE: 110 MMHG | HEART RATE: 80 BPM | DIASTOLIC BLOOD PRESSURE: 71 MMHG

## 2023-09-18 DIAGNOSIS — K21.9 GASTRO-ESOPHAGEAL REFLUX DISEASE W/OUT ESOPHAGITIS: ICD-10-CM

## 2023-09-18 PROCEDURE — 99214 OFFICE O/P EST MOD 30 MIN: CPT | Mod: 25

## 2023-09-18 PROCEDURE — 94010 BREATHING CAPACITY TEST: CPT

## 2023-09-18 PROCEDURE — 94729 DIFFUSING CAPACITY: CPT

## 2023-09-18 PROCEDURE — ZZZZZ: CPT

## 2023-09-18 PROCEDURE — 94727 GAS DIL/WSHOT DETER LNG VOL: CPT

## 2023-09-19 ENCOUNTER — APPOINTMENT (OUTPATIENT)
Dept: CARDIOLOGY | Facility: CLINIC | Age: 78
End: 2023-09-19
Payer: MEDICARE

## 2023-09-19 VITALS
TEMPERATURE: 98.2 F | WEIGHT: 149 LBS | HEIGHT: 68 IN | RESPIRATION RATE: 18 BRPM | DIASTOLIC BLOOD PRESSURE: 78 MMHG | SYSTOLIC BLOOD PRESSURE: 130 MMHG | OXYGEN SATURATION: 98 % | BODY MASS INDEX: 22.58 KG/M2 | HEART RATE: 78 BPM

## 2023-09-19 DIAGNOSIS — R53.83 OTHER FATIGUE: ICD-10-CM

## 2023-09-19 PROCEDURE — 99213 OFFICE O/P EST LOW 20 MIN: CPT

## 2023-10-05 ENCOUNTER — APPOINTMENT (OUTPATIENT)
Dept: ORTHOPEDIC SURGERY | Facility: CLINIC | Age: 78
End: 2023-10-05

## 2023-10-08 ENCOUNTER — RX RENEWAL (OUTPATIENT)
Age: 78
End: 2023-10-08

## 2023-11-20 ENCOUNTER — APPOINTMENT (OUTPATIENT)
Dept: PULMONOLOGY | Facility: CLINIC | Age: 78
End: 2023-11-20
Payer: MEDICARE

## 2023-11-20 VITALS — DIASTOLIC BLOOD PRESSURE: 76 MMHG | HEART RATE: 87 BPM | SYSTOLIC BLOOD PRESSURE: 112 MMHG | OXYGEN SATURATION: 97 %

## 2023-11-20 DIAGNOSIS — M10.9 GOUT, UNSPECIFIED: ICD-10-CM

## 2023-11-20 DIAGNOSIS — R06.2 OTHER SPECIFIED COUGH: ICD-10-CM

## 2023-11-20 DIAGNOSIS — R06.09 OTHER FORMS OF DYSPNEA: ICD-10-CM

## 2023-11-20 DIAGNOSIS — R05.8 OTHER SPECIFIED COUGH: ICD-10-CM

## 2023-11-20 PROCEDURE — 94010 BREATHING CAPACITY TEST: CPT

## 2023-11-20 PROCEDURE — 94618 PULMONARY STRESS TESTING: CPT

## 2023-11-20 PROCEDURE — 99214 OFFICE O/P EST MOD 30 MIN: CPT | Mod: 25

## 2023-11-20 RX ORDER — METHYLPREDNISOLONE 4 MG/1
4 TABLET ORAL
Qty: 1 | Refills: 0 | Status: ACTIVE | COMMUNITY
Start: 2023-11-20 | End: 1900-01-01

## 2023-12-17 ENCOUNTER — RX RENEWAL (OUTPATIENT)
Age: 78
End: 2023-12-17

## 2024-01-08 ENCOUNTER — APPOINTMENT (OUTPATIENT)
Dept: PULMONOLOGY | Facility: CLINIC | Age: 79
End: 2024-01-08
Payer: MEDICARE

## 2024-01-08 VITALS
OXYGEN SATURATION: 99 % | HEART RATE: 92 BPM | DIASTOLIC BLOOD PRESSURE: 74 MMHG | SYSTOLIC BLOOD PRESSURE: 118 MMHG | RESPIRATION RATE: 16 BRPM

## 2024-01-08 DIAGNOSIS — R09.89 OTHER SPECIFIED SYMPTOMS AND SIGNS INVOLVING THE CIRCULATORY AND RESPIRATORY SYSTEMS: ICD-10-CM

## 2024-01-08 PROCEDURE — 99214 OFFICE O/P EST MOD 30 MIN: CPT | Mod: 25

## 2024-01-08 PROCEDURE — 71046 X-RAY EXAM CHEST 2 VIEWS: CPT

## 2024-01-08 PROCEDURE — 94010 BREATHING CAPACITY TEST: CPT

## 2024-01-08 NOTE — HISTORY OF PRESENT ILLNESS
[Former] : former [>= 20 pack years] : >= 20 pack years [TextBox_4] : 78-year-old male patient presents with his daughter Noted ANORO with nsx interval improvement of BUTLER SOB no overall change Abnormal CT chest Extensive tobacco history likely greater than 15 years discontinued tobacco for approximately 1 year dating back to 2022 Smokes 1 cigar/day Retired  No clear description of chemical toxic inhalational exposures Chief complaint exertional dyspnea Chronic cough Occasional yellow sputum Occasional wheeze with cough Symptoms nocturnal component reported by patient's daughter with chest congestion noted The exertional dyspnea is predominantly with stair climbing No prior history reported asthma pneumonia COPD emphysema interstitial lung disease No history reported of an MRI congestive heart failure No history of skin lesions such as eczema Does not report significant allergy symptomatology No reported history of a known COVID-19 infection COVID-vaccine Pfizer 2 initial doses +1 booster WBC received a pneumonia vaccine at pharmacy.  You should  [YearQuit] : 2022 [TextBox_29] : Daily cigar

## 2024-01-08 NOTE — DISCUSSION/SUMMARY
[FreeTextEntry1] : Mild restrictive ventilatory impairment Calcified pleural plaques suggestive of prior asbestos exposure No clinical findings to suggest interstitial lung disease although asbestos related lung disease can account for the restrictive component Component of obstructive airway disease with demonstrated air trapping on the PFT data Recommendations Former extensive tobacco history with pleural plaques suggestive of prior asbestos exposure makes this patient higher risk for lung cancer Therefore yearly chest CT scan recommended For the cough occasional wheeze and obstructive airway disease Sample Anoro Ellipta trial 1 puff daily with detailed instructions provided- Change to Trelegy if covered As needed albuterol with MDI instructions provided declines Flu  vaccine  RSV addressed Newest COVID  vaccine  addressed Office follow-up several weeks to see response to therapy Recommend continue controller therapy Recommended to follow-up with cardiology primary care physician regarding any treatment for erectile dysfunction Follow-up primary care physician

## 2024-01-08 NOTE — PROCEDURE
[FreeTextEntry1] : Spirometry no bronchodilator January 2021 Mild reduction flow rates No decline compared to prior data  Chest x-ray almost 1 year follow-up January 8, 2024 Cardiac size is normal Lung fields are clear No parenchymal infiltrates pleural effusions with dominant pulmonary nodules Cannot exclude a plaque of the right hemidiaphragm but no other obvious identified pleural plaque noted in a patient with known   Spirometry no bronchodilator November 20, 2023 Mild reduction in flow rates Compared to September 18, 2023 there is a greater than 200 cc decline at the FVC  Pulmonary 6-minute walk exercise study November 20, 2023 Baseline O2 saturation 96% There is no significant andrew desaturation on room air Lowest O2 saturation 93% with good recovery Impression normal study   PFT September 18, 2023 Mild reduction flow rates Ratio 79 TLC 77% predicted Diffusion 70% predicted Overall very mild obstructive restrictive ventilatory impairment Significant interval improvement of flow rates without decline at diffusion or TLC   Luis 6/26/23 moderate reduction flow  rates  pos  decline  PFT Federica 15, 2023 Mild restrictive ventilatory impairment TLC 75% predicted Positive air-trapping with RV/TLC ratio 130% predicted Diffusion 63% predicted with a mild to moderate loss of functioning alveolar Units Hemoglobin 13.2  CT chest April 26, 2023 Predominantly left-sided calcified pleural plaque Bilateral lower lobe linear atelectatic scar No dominant pulmonary nodules Bhavya mediastinum unremarkable No bony lesions Impression calcified pleural plaque Clinical correlation  Pulmonary 6 normal exercise study Federica 15, 2023 Baseline O2 saturation 97% Impression normal study No room air desaturation

## 2024-01-14 ENCOUNTER — RX RENEWAL (OUTPATIENT)
Age: 79
End: 2024-01-14

## 2024-02-11 ENCOUNTER — RX RENEWAL (OUTPATIENT)
Age: 79
End: 2024-02-11

## 2024-02-11 RX ORDER — ALBUTEROL SULFATE 90 UG/1
108 (90 BASE) INHALANT RESPIRATORY (INHALATION)
Qty: 1 | Refills: 1 | Status: ACTIVE | COMMUNITY
Start: 2023-03-27 | End: 1900-01-01

## 2024-02-21 ENCOUNTER — APPOINTMENT (OUTPATIENT)
Dept: CARDIOLOGY | Facility: CLINIC | Age: 79
End: 2024-02-21
Payer: MEDICARE

## 2024-02-21 VITALS
BODY MASS INDEX: 21.98 KG/M2 | SYSTOLIC BLOOD PRESSURE: 130 MMHG | TEMPERATURE: 98.2 F | HEIGHT: 68 IN | HEART RATE: 87 BPM | DIASTOLIC BLOOD PRESSURE: 70 MMHG | WEIGHT: 145 LBS | OXYGEN SATURATION: 98 %

## 2024-02-21 DIAGNOSIS — Z13.228 ENCOUNTER FOR SCREENING FOR OTHER METABOLIC DISORDERS: ICD-10-CM

## 2024-02-21 DIAGNOSIS — R79.89 OTHER SPECIFIED ABNORMAL FINDINGS OF BLOOD CHEMISTRY: ICD-10-CM

## 2024-02-21 PROCEDURE — 99214 OFFICE O/P EST MOD 30 MIN: CPT

## 2024-02-21 PROCEDURE — G2211 COMPLEX E/M VISIT ADD ON: CPT

## 2024-02-21 PROCEDURE — 93000 ELECTROCARDIOGRAM COMPLETE: CPT

## 2024-02-23 ENCOUNTER — APPOINTMENT (OUTPATIENT)
Dept: UROLOGY | Facility: CLINIC | Age: 79
End: 2024-02-23

## 2024-02-25 ENCOUNTER — NON-APPOINTMENT (OUTPATIENT)
Age: 79
End: 2024-02-25

## 2024-02-26 ENCOUNTER — APPOINTMENT (OUTPATIENT)
Dept: PULMONOLOGY | Facility: CLINIC | Age: 79
End: 2024-02-26
Payer: MEDICARE

## 2024-02-26 ENCOUNTER — APPOINTMENT (OUTPATIENT)
Dept: CARDIOLOGY | Facility: CLINIC | Age: 79
End: 2024-02-26
Payer: MEDICARE

## 2024-02-26 VITALS — OXYGEN SATURATION: 97 % | DIASTOLIC BLOOD PRESSURE: 70 MMHG | SYSTOLIC BLOOD PRESSURE: 110 MMHG | HEART RATE: 86 BPM

## 2024-02-26 DIAGNOSIS — J92.9 PLEURAL PLAQUE W/OUT ASBESTOS: ICD-10-CM

## 2024-02-26 DIAGNOSIS — R94.2 ABNORMAL RESULTS OF PULMONARY FUNCTION STUDIES: ICD-10-CM

## 2024-02-26 LAB
ALBUMIN SERPL ELPH-MCNC: 3.7 G/DL
ALP BLD-CCNC: 124 U/L
ALT SERPL-CCNC: 26 U/L
ANION GAP SERPL CALC-SCNC: 14 MMOL/L
APPEARANCE: CLEAR
AST SERPL-CCNC: 35 U/L
BACTERIA: NEGATIVE /HPF
BASOPHILS # BLD AUTO: 0.06 K/UL
BASOPHILS NFR BLD AUTO: 0.9 %
BILIRUB SERPL-MCNC: 0.2 MG/DL
BILIRUBIN URINE: NEGATIVE
BLOOD URINE: NEGATIVE
BUN SERPL-MCNC: 22 MG/DL
CALCIUM SERPL-MCNC: 8.9 MG/DL
CAST: 0 /LPF
CHLORIDE SERPL-SCNC: 105 MMOL/L
CHOLEST SERPL-MCNC: 192 MG/DL
CO2 SERPL-SCNC: 18 MMOL/L
COLOR: YELLOW
CREAT SERPL-MCNC: 1.23 MG/DL
EGFR: 60 ML/MIN/1.73M2
EOSINOPHIL # BLD AUTO: 0.26 K/UL
EOSINOPHIL NFR BLD AUTO: 3.8 %
EPITHELIAL CELLS: 0 /HPF
ESTIMATED AVERAGE GLUCOSE: 108 MG/DL
FERRITIN SERPL-MCNC: 85 NG/ML
FOLATE SERPL-MCNC: 10.6 NG/ML
GLUCOSE QUALITATIVE U: NEGATIVE MG/DL
GLUCOSE SERPL-MCNC: 68 MG/DL
HBA1C MFR BLD HPLC: 5.4 %
HCT VFR BLD CALC: 42 %
HDLC SERPL-MCNC: 77 MG/DL
HGB BLD-MCNC: 13.4 G/DL
IMM GRANULOCYTES NFR BLD AUTO: 0.6 %
IRON SATN MFR SERPL: 21 %
IRON SERPL-MCNC: 59 UG/DL
KETONES URINE: NEGATIVE MG/DL
LDLC SERPL CALC-MCNC: 102 MG/DL
LEUKOCYTE ESTERASE URINE: NEGATIVE
LYMPHOCYTES # BLD AUTO: 0.91 K/UL
LYMPHOCYTES NFR BLD AUTO: 13.3 %
MAN DIFF?: NORMAL
MCHC RBC-ENTMCNC: 29.9 PG
MCHC RBC-ENTMCNC: 31.9 GM/DL
MCV RBC AUTO: 93.8 FL
MICROSCOPIC-UA: NORMAL
MONOCYTES # BLD AUTO: 0.77 K/UL
MONOCYTES NFR BLD AUTO: 11.3 %
NEUTROPHILS # BLD AUTO: 4.8 K/UL
NEUTROPHILS NFR BLD AUTO: 70.1 %
NITRITE URINE: NEGATIVE
NONHDLC SERPL-MCNC: 115 MG/DL
PH URINE: 7.5
PLATELET # BLD AUTO: 227 K/UL
POTASSIUM SERPL-SCNC: 4.9 MMOL/L
PROT SERPL-MCNC: 6.9 G/DL
PROTEIN URINE: NEGATIVE MG/DL
RBC # BLD: 4.48 M/UL
RBC # FLD: 15.8 %
RED BLOOD CELLS URINE: 1 /HPF
SODIUM SERPL-SCNC: 138 MMOL/L
SPECIFIC GRAVITY URINE: 1.02
T4 FREE SERPL-MCNC: 1 NG/DL
TIBC SERPL-MCNC: 282 UG/DL
TRANSFERRIN SERPL-MCNC: 214 MG/DL
TRIGL SERPL-MCNC: 72 MG/DL
TSH SERPL-ACNC: 0.41 UIU/ML
UIBC SERPL-MCNC: 222 UG/DL
UROBILINOGEN URINE: 0.2 MG/DL
VIT B12 SERPL-MCNC: 1024 PG/ML
WBC # FLD AUTO: 6.84 K/UL
WHITE BLOOD CELLS URINE: 0 /HPF

## 2024-02-26 PROCEDURE — 93880 EXTRACRANIAL BILAT STUDY: CPT

## 2024-02-26 PROCEDURE — 93306 TTE W/DOPPLER COMPLETE: CPT

## 2024-02-26 PROCEDURE — ZZZZZ: CPT

## 2024-02-26 PROCEDURE — 94729 DIFFUSING CAPACITY: CPT

## 2024-02-26 PROCEDURE — 94727 GAS DIL/WSHOT DETER LNG VOL: CPT

## 2024-02-26 PROCEDURE — 99214 OFFICE O/P EST MOD 30 MIN: CPT | Mod: 25

## 2024-02-26 PROCEDURE — 94010 BREATHING CAPACITY TEST: CPT

## 2024-02-26 NOTE — PROCEDURE
[FreeTextEntry1] : PFT 2/28/24  moderate reduction flow  rates  TLC 70 % pred  no  air trapping  DLCO 59 % with mod loss fx  alveolar  capillary units HGB 13.4 pos  decline  correlate with pending CT CHEST  Spirometry no bronchodilator January 8 2024 Mild reduction flow rates No decline compared to prior data  Chest x-ray almost 1 year follow-up January 8, 2024 Cardiac size is normal Lung fields are clear No parenchymal infiltrates pleural effusions with dominant pulmonary nodules Cannot exclude a plaque of the right hemidiaphragm but no other obvious identified pleural plaque noted in a patient with known   Spirometry no bronchodilator November 20, 2023 Mild reduction in flow rates Compared to September 18, 2023 there is a greater than 200 cc decline at the FVC  Pulmonary 6-minute walk exercise study November 20, 2023 Baseline O2 saturation 96% There is no significant andrew desaturation on room air Lowest O2 saturation 93% with good recovery Impression normal study   PFT September 18, 2023 Mild reduction flow rates Ratio 79 TLC 77% predicted Diffusion 70% predicted Overall very mild obstructive restrictive ventilatory impairment Significant interval improvement of flow rates without decline at diffusion or TLC   [place] 6/26/23 moderate reduction flow  rates  pos  decline  PFT [place] 15, 2023 Mild restrictive ventilatory impairment TLC 75% predicted Positive air-trapping with RV/TLC ratio 130% predicted Diffusion 63% predicted with a mild to moderate loss of functioning alveolar Units Hemoglobin 13.2  CT chest April 26, 2023 Predominantly left-sided calcified pleural plaque Bilateral lower lobe linear atelectatic scar No dominant pulmonary nodules Bhavya mediastinum unremarkable No bony lesions Impression calcified pleural plaque Clinical correlation  Pulmonary 6 normal exercise study [place] 15, 2023 Baseline O2 saturation 97% Impression normal study No room air desaturation

## 2024-02-26 NOTE — PHYSICAL EXAM
[No Acute Distress] : no acute distress [Normal Oropharynx] : normal oropharynx [II] : Mallampati Class: II [Normal Appearance] : normal appearance [Supple] : supple [No Neck Mass] : no neck mass [No JVD] : no jvd [Normal Rate/Rhythm] : normal rate/rhythm [Normal S1, S2] : normal s1, s2 [No Murmurs] : no murmurs [No Gallops] : no gallops [No Rubs] : no rubs [No Resp Distress] : no resp distress [Normal Palpation] : normal palpation [Normal Rhythm and Effort] : normal rhythm and effort [Wheeze] : wheeze [No Abnormalities] : no abnormalities [Benign] : benign [Not Tender] : not tender [No HSM] : no hsm [Soft] : soft [Normal Bowel Sounds] : normal bowel sounds [Normal Gait] : normal gait [No Cyanosis] : no cyanosis [No Clubbing] : no clubbing [No Edema] : no edema [Normal Color/ Pigmentation] : normal color/ pigmentation [No Focal Deficits] : no focal deficits [Oriented x3] : oriented x3 [Normal Affect] : normal affect [TextBox_68] : Negative inspiratory crackles

## 2024-02-26 NOTE — DISCUSSION/SUMMARY
[FreeTextEntry1] : NOTED pending CT CHEST  Mild restrictive ventilatory impairment Calcified pleural plaques suggestive of prior asbestos exposure No clinical findings to suggest interstitial lung disease although asbestos related lung disease can account for the restrictive component Component of obstructive airway disease with demonstrated air trapping on the PFT data Recommendations Former extensive tobacco history with pleural plaques suggestive of prior asbestos exposure makes this patient higher risk for lung cancer Therefore yearly chest CT scan recommended For the cough occasional wheeze and obstructive airway disease Sample Anoro Ellipta trial 1 puff daily with detailed instructions provided- Change to Trelegy if covered As needed albuterol with MDI instructions provided declines Flu  vaccine  RSV addressed Newest COVID  vaccine  addressed Office follow-up several weeks to see response to therapy Recommend continue controller therapy Recommended to follow-up with cardiology primary care physician regarding any treatment for erectile dysfunction Follow-up primary care physician

## 2024-02-26 NOTE — REVIEW OF SYSTEMS
[Fatigue] : fatigue [Negative] : Dermatologic [Fever] : no fever [Chills] : no chills [Poor Appetite] : no poor appetite [Recent Wt Loss (___ Lbs)] : ~T no recent weight loss [Diabetes] : no diabetes [Thyroid Problem] : no thyroid problem [TextBox_30] : HPI [TextBox_83] : History prostate cancer [TextBox_44] : Hypertension [TextBox_110] : History low B12 level, history of anemia [TextBox_119] : Reported history of an abnormal brain MRI [TextBox_141] : Vitamin D deficiency

## 2024-03-06 ENCOUNTER — RX RENEWAL (OUTPATIENT)
Age: 79
End: 2024-03-06

## 2024-03-06 PROCEDURE — 93241 XTRNL ECG REC>48HR<7D: CPT

## 2024-03-06 RX ORDER — TAMSULOSIN HYDROCHLORIDE 0.4 MG/1
0.4 CAPSULE ORAL
Qty: 180 | Refills: 2 | Status: ACTIVE | COMMUNITY
Start: 2022-10-20 | End: 1900-01-01

## 2024-03-15 ENCOUNTER — APPOINTMENT (OUTPATIENT)
Dept: UROLOGY | Facility: CLINIC | Age: 79
End: 2024-03-15

## 2024-04-04 ENCOUNTER — APPOINTMENT (OUTPATIENT)
Dept: RADIATION ONCOLOGY | Facility: CLINIC | Age: 79
End: 2024-04-04
Payer: MEDICARE

## 2024-04-15 ENCOUNTER — APPOINTMENT (OUTPATIENT)
Dept: CARDIOLOGY | Facility: CLINIC | Age: 79
End: 2024-04-15
Payer: MEDICARE

## 2024-04-15 VITALS
SYSTOLIC BLOOD PRESSURE: 116 MMHG | OXYGEN SATURATION: 95 % | HEART RATE: 94 BPM | BODY MASS INDEX: 22.43 KG/M2 | DIASTOLIC BLOOD PRESSURE: 80 MMHG | HEIGHT: 68 IN | TEMPERATURE: 98.4 F | WEIGHT: 148 LBS

## 2024-04-15 DIAGNOSIS — D64.9 ANEMIA, UNSPECIFIED: ICD-10-CM

## 2024-04-15 DIAGNOSIS — C61 MALIGNANT NEOPLASM OF PROSTATE: ICD-10-CM

## 2024-04-15 DIAGNOSIS — R42 DIZZINESS AND GIDDINESS: ICD-10-CM

## 2024-04-15 DIAGNOSIS — N52.9 MALE ERECTILE DYSFUNCTION, UNSPECIFIED: ICD-10-CM

## 2024-04-15 DIAGNOSIS — I10 ESSENTIAL (PRIMARY) HYPERTENSION: ICD-10-CM

## 2024-04-15 DIAGNOSIS — R05.9 COUGH, UNSPECIFIED: ICD-10-CM

## 2024-04-15 PROCEDURE — 99214 OFFICE O/P EST MOD 30 MIN: CPT

## 2024-04-15 PROCEDURE — 93000 ELECTROCARDIOGRAM COMPLETE: CPT

## 2024-04-15 PROCEDURE — G2211 COMPLEX E/M VISIT ADD ON: CPT

## 2024-04-15 RX ORDER — SILDENAFIL 100 MG/1
100 TABLET, FILM COATED ORAL
Qty: 10 | Refills: 3 | Status: ACTIVE | COMMUNITY
Start: 2024-02-21 | End: 1900-01-01

## 2024-04-15 NOTE — HISTORY OF PRESENT ILLNESS
[FreeTextEntry1] :  This is a 78 year male with a Pmhx of anemia htn prostate ca s/p radiation s/p seeds who presents to the office for follow up pt last seen 2 months ago c/o dizziness s/p normal carotid dopplers . echo EF 60% holter SR rare PVCs. He reports dizziness has improved significantly. Patient also reported cough at last visit, was advised for CT chest.  Patient denies chest pain, dyspnea, palpitations, dizziness, syncope, changes in bowel/bladder habits or appetite.

## 2024-04-17 ENCOUNTER — OUTPATIENT (OUTPATIENT)
Dept: OUTPATIENT SERVICES | Facility: HOSPITAL | Age: 79
LOS: 1 days | End: 2024-04-17
Payer: MEDICARE

## 2024-04-17 ENCOUNTER — APPOINTMENT (OUTPATIENT)
Dept: CT IMAGING | Facility: CLINIC | Age: 79
End: 2024-04-17
Payer: MEDICARE

## 2024-04-17 DIAGNOSIS — Z98.890 OTHER SPECIFIED POSTPROCEDURAL STATES: Chronic | ICD-10-CM

## 2024-04-17 DIAGNOSIS — R97.20 ELEVATED PROSTATE SPECIFIC ANTIGEN [PSA]: Chronic | ICD-10-CM

## 2024-04-17 DIAGNOSIS — R05.9 COUGH, UNSPECIFIED: ICD-10-CM

## 2024-04-17 PROCEDURE — 71250 CT THORAX DX C-: CPT | Mod: 26

## 2024-04-17 PROCEDURE — 71250 CT THORAX DX C-: CPT

## 2024-04-22 ENCOUNTER — APPOINTMENT (OUTPATIENT)
Dept: PULMONOLOGY | Facility: CLINIC | Age: 79
End: 2024-04-22
Payer: MEDICARE

## 2024-04-22 VITALS — DIASTOLIC BLOOD PRESSURE: 62 MMHG | HEART RATE: 76 BPM | SYSTOLIC BLOOD PRESSURE: 94 MMHG | OXYGEN SATURATION: 99 %

## 2024-04-22 DIAGNOSIS — J43.8 OTHER EMPHYSEMA: ICD-10-CM

## 2024-04-22 DIAGNOSIS — J44.9 CHRONIC OBSTRUCTIVE PULMONARY DISEASE, UNSPECIFIED: ICD-10-CM

## 2024-04-22 DIAGNOSIS — Z77.090 CONTACT WITH AND (SUSPECTED) EXPOSURE TO ASBESTOS: ICD-10-CM

## 2024-04-22 PROCEDURE — 94010 BREATHING CAPACITY TEST: CPT

## 2024-04-22 PROCEDURE — 99214 OFFICE O/P EST MOD 30 MIN: CPT | Mod: 25

## 2024-04-22 NOTE — DISCUSSION/SUMMARY
[FreeTextEntry1] : NOTED  CT CHEST reviewed  paraseptal Emphysema Interval improvement at flow rates Mild restrictive ventilatory impairment Calcified pleural plaques suggestive of prior asbestos exposure No clinical findings to suggest interstitial lung disease although asbestos related lung disease can account for the restrictive component Component of obstructive airway disease with demonstrated air trapping on the PFT data Recommendations Former extensive tobacco history with pleural plaques suggestive of prior asbestos exposure makes this patient higher risk for lung cancer Therefore yearly chest CT scan recommended For the cough occasional wheeze and obstructive airway disease Sample Anoro Ellipta trial 1 puff daily with detailed instructions provided- Change to Trelegy if covered As needed albuterol with MDI instructions provided declines Flu  vaccine  RSV addressed Newest COVID  vaccine  addressed Office follow-up several weeks to see response to therapy Recommend continue controller therapy Recommended to follow-up with cardiology primary care physician regarding any treatment for erectile dysfunction Follow-up primary care physician

## 2024-04-22 NOTE — PROCEDURE
[FreeTextEntry1] : CT CHEST  - ORDERED BY: YANN BOLANOS PROCEDURE DATE:  04/17/2024 INTERPRETATION:  CLINICAL INFORMATION: cough COMPARISON: CT chest 4/26/2023 CONTRAST/COMPLICATIONS: IV Contrast: None Oral Contrast: None Complications: None PROCEDURE: CT of the Chest was performed. Sagittal and coronal reformats were performed. FINDINGS: LUNGS AND AIRWAYS: Patent central airways. No bronchial thickening or bronchiectasis. Mild paraseptal emphysema. Mild linear atelectasis within the lung bases. The lungs are otherwise clear. PLEURA: No pleural effusion. Stable mild posterior left upper pleural calcification. MEDIASTINUM AND BHAVYA: No lymphadenopathy. VESSELS: Vascular calcifications of the coronary arteries and aorta. HEART: Heart size is normal. Trace fluid along the superior pericardial recess. CHEST WALL AND LOWER NECK: Within normal limits. VISUALIZED UPPER ABDOMEN: Within normal limits. BONES: Mild degenerative changes IMPRESSION: No acute pulmonary process. Mild emphysema.  JIM No BD 4/22/24  mild  reduction flow rates interval improvement   PFT 2/28/24  moderate reduction flow  rates  TLC 70 % pred  no  air trapping  DLCO 59 % with mod loss fx  alveolar  capillary units HGB 13.4 pos  decline  correlate with pending CT CHEST  Spirometry no bronchodilator January 8 2024 Mild reduction flow rates No decline compared to prior data  Chest x-ray almost 1 year follow-up January 8, 2024 Cardiac size is normal Lung fields are clear No parenchymal infiltrates pleural effusions with dominant pulmonary nodules Cannot exclude a plaque of the right hemidiaphragm but no other obvious identified pleural plaque noted in a patient with known   Spirometry no bronchodilator November 20, 2023 Mild reduction in flow rates Compared to September 18, 2023 there is a greater than 200 cc decline at the FVC  Pulmonary 6-minute walk exercise study November 20, 2023 Baseline O2 saturation 96% There is no significant andrew desaturation on room air Lowest O2 saturation 93% with good recovery Impression normal study   PFT September 18, 2023 Mild reduction flow rates Ratio 79 TLC 77% predicted Diffusion 70% predicted Overall very mild obstructive restrictive ventilatory impairment Significant interval improvement of flow rates without decline at diffusion or TLC   [place] 6/26/23 moderate reduction flow  rates  pos  decline  PFT [place]er[place] 15, 2023 Mild restrictive ventilatory impairment TLC 75% predicted Positive air-trapping with RV/TLC ratio 130% predicted Diffusion 63% predicted with a mild to moderate loss of functioning alveolar Units Hemoglobin 13.2  CT chest April 26, 2023 Predominantly left-sided calcified pleural plaque Bilateral lower lobe linear atelectatic scar No dominant pulmonary nodules Bhavya mediastinum unremarkable No bony lesions Impression calcified pleural plaque Clinical correlation  Pulmonary 6 normal exercise study [place]er[place] 15, 2023 Baseline O2 saturation 97% Impression normal study No room air desaturation

## 2024-04-22 NOTE — PHYSICAL EXAM
[No Acute Distress] : no acute distress [Normal Oropharynx] : normal oropharynx [II] : Mallampati Class: II [Normal Appearance] : normal appearance [Supple] : supple [No Neck Mass] : no neck mass [No JVD] : no jvd [Normal Rate/Rhythm] : normal rate/rhythm [Normal S1, S2] : normal s1, s2 [No Murmurs] : no murmurs [No Rubs] : no rubs [No Gallops] : no gallops [No Resp Distress] : no resp distress [Normal Palpation] : normal palpation [Normal Rhythm and Effort] : normal rhythm and effort [Wheeze] : wheeze [No Abnormalities] : no abnormalities [Benign] : benign [Not Tender] : not tender [Soft] : soft [No HSM] : no hsm [Normal Bowel Sounds] : normal bowel sounds [Normal Gait] : normal gait [No Clubbing] : no clubbing [No Cyanosis] : no cyanosis [No Edema] : no edema [Normal Color/ Pigmentation] : normal color/ pigmentation [No Focal Deficits] : no focal deficits [Oriented x3] : oriented x3 [Normal Affect] : normal affect [TextBox_68] : Negative inspiratory crackles, pos  coarse BS

## 2024-05-01 ENCOUNTER — NON-APPOINTMENT (OUTPATIENT)
Age: 79
End: 2024-05-01

## 2024-05-07 LAB
25(OH)D3 SERPL-MCNC: 39.7 NG/ML
ALBUMIN SERPL ELPH-MCNC: 4.2 G/DL
ALBUMIN SERPL ELPH-MCNC: 4.4 G/DL
ALP BLD-CCNC: 115 U/L
ALP BLD-CCNC: 140 U/L
ALT SERPL-CCNC: 17 U/L
ALT SERPL-CCNC: 22 U/L
ANION GAP SERPL CALC-SCNC: 13 MMOL/L
ANION GAP SERPL CALC-SCNC: 15 MMOL/L
ANION GAP SERPL CALC-SCNC: 17 MMOL/L
APPEARANCE: CLEAR
APPEARANCE: CLEAR
AST SERPL-CCNC: 26 U/L
AST SERPL-CCNC: 27 U/L
BACTERIA: NEGATIVE
BACTERIA: NEGATIVE /HPF
BASOPHILS # BLD AUTO: 0.05 K/UL
BASOPHILS # BLD AUTO: 0.07 K/UL
BASOPHILS NFR BLD AUTO: 0.8 %
BASOPHILS NFR BLD AUTO: 0.9 %
BILIRUB SERPL-MCNC: 0.2 MG/DL
BILIRUB SERPL-MCNC: 0.3 MG/DL
BILIRUBIN URINE: NEGATIVE
BILIRUBIN URINE: NEGATIVE
BLOOD URINE: NEGATIVE
BLOOD URINE: NEGATIVE
BUN SERPL-MCNC: 11 MG/DL
BUN SERPL-MCNC: 16 MG/DL
BUN SERPL-MCNC: 21 MG/DL
CALCIUM SERPL-MCNC: 9.4 MG/DL
CALCIUM SERPL-MCNC: 9.4 MG/DL
CALCIUM SERPL-MCNC: 9.9 MG/DL
CAST: 0 /LPF
CHLORIDE SERPL-SCNC: 101 MMOL/L
CHLORIDE SERPL-SCNC: 102 MMOL/L
CHLORIDE SERPL-SCNC: 106 MMOL/L
CHOLEST SERPL-MCNC: 184 MG/DL
CHOLEST SERPL-MCNC: 185 MG/DL
CHOLEST SERPL-MCNC: 193 MG/DL
CO2 SERPL-SCNC: 17 MMOL/L
CO2 SERPL-SCNC: 19 MMOL/L
CO2 SERPL-SCNC: 20 MMOL/L
COLOR: NORMAL
COLOR: YELLOW
CREAT SERPL-MCNC: 1.04 MG/DL
CREAT SERPL-MCNC: 1.09 MG/DL
CREAT SERPL-MCNC: 1.23 MG/DL
EGFR: 60 ML/MIN/1.73M2
EGFR: 69 ML/MIN/1.73M2
EGFR: 74 ML/MIN/1.73M2
EOSINOPHIL # BLD AUTO: 0.2 K/UL
EOSINOPHIL # BLD AUTO: 0.31 K/UL
EOSINOPHIL NFR BLD AUTO: 3.3 %
EOSINOPHIL NFR BLD AUTO: 4.2 %
EPITHELIAL CELLS: 0 /HPF
ESTIMATED AVERAGE GLUCOSE: 111 MG/DL
ESTIMATED AVERAGE GLUCOSE: 111 MG/DL
ESTIMATED AVERAGE GLUCOSE: 114 MG/DL
FERRITIN SERPL-MCNC: 140 NG/ML
FERRITIN SERPL-MCNC: 147 NG/ML
FOLATE SERPL-MCNC: 10.8 NG/ML
FOLATE SERPL-MCNC: 12.2 NG/ML
FOLATE SERPL-MCNC: 13.9 NG/ML
GLUCOSE QUALITATIVE U: NEGATIVE
GLUCOSE QUALITATIVE U: NEGATIVE MG/DL
GLUCOSE SERPL-MCNC: 74 MG/DL
GLUCOSE SERPL-MCNC: 77 MG/DL
GLUCOSE SERPL-MCNC: 82 MG/DL
HAPTOGLOB SERPL-MCNC: 238 MG/DL
HBA1C MFR BLD HPLC: 5.5 %
HBA1C MFR BLD HPLC: 5.5 %
HBA1C MFR BLD HPLC: 5.6 %
HCT VFR BLD CALC: 39.1 %
HCT VFR BLD CALC: 45.1 %
HDLC SERPL-MCNC: 54 MG/DL
HDLC SERPL-MCNC: 69 MG/DL
HDLC SERPL-MCNC: 71 MG/DL
HGB BLD-MCNC: 12.3 G/DL
HGB BLD-MCNC: 15 G/DL
HYALINE CASTS: 0 /LPF
IMM GRANULOCYTES NFR BLD AUTO: 0.4 %
IMM GRANULOCYTES NFR BLD AUTO: 0.8 %
IRON SATN MFR SERPL: 15 %
IRON SATN MFR SERPL: 40 %
IRON SERPL-MCNC: 111 UG/DL
IRON SERPL-MCNC: 48 UG/DL
IRON SERPL-MCNC: 80 UG/DL
KETONES URINE: NEGATIVE
KETONES URINE: NEGATIVE MG/DL
LDH SERPL-CCNC: 182 U/L
LDLC SERPL CALC-MCNC: 109 MG/DL
LDLC SERPL CALC-MCNC: 96 MG/DL
LDLC SERPL DIRECT ASSAY-MCNC: 108 MG/DL
LEUKOCYTE ESTERASE URINE: NEGATIVE
LEUKOCYTE ESTERASE URINE: NEGATIVE
LYMPHOCYTES # BLD AUTO: 0.77 K/UL
LYMPHOCYTES # BLD AUTO: 1.72 K/UL
LYMPHOCYTES NFR BLD AUTO: 12.7 %
LYMPHOCYTES NFR BLD AUTO: 23.3 %
MAGNESIUM SERPL-MCNC: 2.2 MG/DL
MAN DIFF?: NORMAL
MAN DIFF?: NORMAL
MCHC RBC-ENTMCNC: 29.7 PG
MCHC RBC-ENTMCNC: 30.2 PG
MCHC RBC-ENTMCNC: 31.5 GM/DL
MCHC RBC-ENTMCNC: 33.3 GM/DL
MCV RBC AUTO: 89.3 FL
MCV RBC AUTO: 96.1 FL
MICROSCOPIC-UA: NORMAL
MICROSCOPIC-UA: NORMAL
MONOCYTES # BLD AUTO: 0.72 K/UL
MONOCYTES # BLD AUTO: 0.74 K/UL
MONOCYTES NFR BLD AUTO: 10 %
MONOCYTES NFR BLD AUTO: 11.9 %
NEUTROPHILS # BLD AUTO: 4.25 K/UL
NEUTROPHILS # BLD AUTO: 4.52 K/UL
NEUTROPHILS NFR BLD AUTO: 61.2 %
NEUTROPHILS NFR BLD AUTO: 70.5 %
NITRITE URINE: NEGATIVE
NITRITE URINE: NEGATIVE
NONHDLC SERPL-MCNC: 113 MG/DL
NONHDLC SERPL-MCNC: 123 MG/DL
NT-PROBNP SERPL-MCNC: 123 PG/ML
PH URINE: 6
PH URINE: 6
PLATELET # BLD AUTO: 147 K/UL
PLATELET # BLD AUTO: 235 K/UL
POTASSIUM SERPL-SCNC: 4.3 MMOL/L
POTASSIUM SERPL-SCNC: 4.7 MMOL/L
POTASSIUM SERPL-SCNC: 4.8 MMOL/L
PROT SERPL-MCNC: 7.6 G/DL
PROT SERPL-MCNC: 8 G/DL
PROTEIN URINE: NEGATIVE
PROTEIN URINE: NEGATIVE MG/DL
PSA SERPL-MCNC: 0.02 NG/ML
RBC # BLD: 4.07 M/UL
RBC # BLD: 5.05 M/UL
RBC # FLD: 12.9 %
RBC # FLD: 13.5 %
RED BLOOD CELLS URINE: 1 /HPF
RED BLOOD CELLS URINE: 3 /HPF
SODIUM SERPL-SCNC: 136 MMOL/L
SODIUM SERPL-SCNC: 136 MMOL/L
SODIUM SERPL-SCNC: 139 MMOL/L
SPECIFIC GRAVITY URINE: 1.02
SPECIFIC GRAVITY URINE: 1.02
SQUAMOUS EPITHELIAL CELLS: 0 /HPF
T4 FREE SERPL-MCNC: 1.1 NG/DL
T4 FREE SERPL-MCNC: 1.2 NG/DL
TIBC SERPL-MCNC: 278 UG/DL
TIBC SERPL-MCNC: 331 UG/DL
TRANSFERRIN SERPL-MCNC: 205 MG/DL
TRANSFERRIN SERPL-MCNC: 216 MG/DL
TRANSFERRIN SERPL-MCNC: 229 MG/DL
TRIGL SERPL-MCNC: 79 MG/DL
TRIGL SERPL-MCNC: 86 MG/DL
TRIGL SERPL-MCNC: 87 MG/DL
TSH SERPL-ACNC: 0.31 UIU/ML
TSH SERPL-ACNC: 0.39 UIU/ML
TSH SERPL-ACNC: 0.58 UIU/ML
UIBC SERPL-MCNC: 167 UG/DL
UIBC SERPL-MCNC: 283 UG/DL
UROBILINOGEN URINE: 0.2 MG/DL
UROBILINOGEN URINE: NORMAL
VIT B12 SERPL-MCNC: 1344 PG/ML
VIT B12 SERPL-MCNC: 1915 PG/ML
VIT B12 SERPL-MCNC: >2000 PG/ML
WBC # FLD AUTO: 6.04 K/UL
WBC # FLD AUTO: 7.39 K/UL
WHITE BLOOD CELLS URINE: 0 /HPF
WHITE BLOOD CELLS URINE: 0 /HPF

## 2024-06-17 ENCOUNTER — RX RENEWAL (OUTPATIENT)
Age: 79
End: 2024-06-17

## 2024-06-17 RX ORDER — UMECLIDINIUM BROMIDE AND VILANTEROL TRIFENATATE 62.5; 25 UG/1; UG/1
62.5-25 POWDER RESPIRATORY (INHALATION)
Qty: 60 | Refills: 5 | Status: ACTIVE | COMMUNITY
Start: 2023-06-15 | End: 1900-01-01

## 2024-06-25 ENCOUNTER — APPOINTMENT (OUTPATIENT)
Dept: RADIATION ONCOLOGY | Facility: CLINIC | Age: 79
End: 2024-06-25
Payer: MEDICARE

## 2024-06-25 VITALS
HEART RATE: 86 BPM | BODY MASS INDEX: 22.5 KG/M2 | SYSTOLIC BLOOD PRESSURE: 115 MMHG | WEIGHT: 148 LBS | RESPIRATION RATE: 16 BRPM | DIASTOLIC BLOOD PRESSURE: 71 MMHG | OXYGEN SATURATION: 99 %

## 2024-06-25 DIAGNOSIS — Z92.3 PERSONAL HISTORY OF IRRADIATION: ICD-10-CM

## 2024-06-25 DIAGNOSIS — Z85.46 PERSONAL HISTORY OF MALIGNANT NEOPLASM OF PROSTATE: ICD-10-CM

## 2024-06-25 DIAGNOSIS — N52.9 MALE ERECTILE DYSFUNCTION, UNSPECIFIED: ICD-10-CM

## 2024-06-25 PROCEDURE — 99213 OFFICE O/P EST LOW 20 MIN: CPT

## 2024-06-25 PROCEDURE — G2211 COMPLEX E/M VISIT ADD ON: CPT

## 2024-06-26 ENCOUNTER — APPOINTMENT (OUTPATIENT)
Dept: PULMONOLOGY | Facility: CLINIC | Age: 79
End: 2024-06-26

## 2024-06-26 PROBLEM — Z92.3 PERSONAL HISTORY OF RADIATION THERAPY: Status: ACTIVE | Noted: 2023-07-30

## 2024-06-26 PROBLEM — N52.9 ORGANIC IMPOTENCE: Status: ACTIVE | Noted: 2022-03-30

## 2024-06-26 PROBLEM — Z85.46 PERSONAL HISTORY OF PROSTATE CANCER: Status: ACTIVE | Noted: 2023-01-20

## 2024-06-26 NOTE — REVIEW OF SYSTEMS
[Negative] : Allergic/Immunologic [FreeTextEntry8] : radiation therapy for prostate cancer [de-identified] : ADT for prostate cancer

## 2024-06-26 NOTE — VITALS
[80: Normal activity with effort; some signs or symptoms of disease.] : 80: Normal activity with effort; some signs or symptoms of disease.  [ECOG Performance Status: 1 - Restricted in physically strenuous activity but ambulatory and able to carry out work of a light or sedentary nature] : Performance Status: 1 - Restricted in physically strenuous activity but ambulatory and able to carry out work of a light or sedentary nature, e.g., light house work, office work [2 - Distress Level] : Distress Level: 2 [Patient given social work contact information and resource sheet] : Patient was given social work contact information and resource sheet [Maximal Pain Intensity: 0/10] : 0/10 [Least Pain Intensity: 0/10] : 0/10

## 2024-06-26 NOTE — DISEASE MANAGEMENT
[1] : T1 [c] : c [0] : M0 [Biopsy] : Patient had a biopsy on [>20] : >20 ng/mL [8] : Template Biopsy Eden Score: 8 [IIIA] : IIIA [Radiation Therapy] : Radiation Therapy [Treatment with radiation therapy] : Treatment with radiation therapy [EBRT] : EBRT [LDR] : LDR [EBRTDose] : 4500cGy [EBRTFractions] : 25 [LDRDose] : 10,000cGy [LDRFractions] : 1

## 2024-06-26 NOTE — HISTORY OF PRESENT ILLNESS
[FreeTextEntry1] : The patient is a 77 y/o man with modest co-morbidities, diagnosed with High-Risk prostate cancer, Eden Score 8 (4+4) with a presenting PSA of 33.4 ng/ml and a prognostic stage of IIIA. He completed definitive radiation therapy to the prostate. He is currently on ADT per Dr. Holland. He is also s/p prostate seed boost on 9/21/22.  At last visit: Reports urinary, hesitancy, nocturia X q 2-3hrs, weak stream, fatigue/tired, taking tamsulosin twice daily. Reports changes in bowel movements of constipation.  Notes no sexual function which is a big problem for him.  PSA  Trend (ng/mL) 0.01 6/14/2023

## 2024-06-26 NOTE — LETTER CLOSING
[Sincerely yours,] : Sincerely yours, [FreeTextEntry3] : Jesus Marina MD Physician in Chief Department of Radiation Medicine [facility]   of Radiation Medicine [employer] at  [facility]  Radiation  [facility]/ [facility] at [place] [address]  Tel: [phone] Fax: [phone]

## 2024-08-12 ENCOUNTER — APPOINTMENT (OUTPATIENT)
Dept: CARDIOLOGY | Facility: CLINIC | Age: 79
End: 2024-08-12
Payer: MEDICARE

## 2024-08-12 VITALS
BODY MASS INDEX: 21.69 KG/M2 | RESPIRATION RATE: 16 BRPM | HEIGHT: 68 IN | OXYGEN SATURATION: 99 % | DIASTOLIC BLOOD PRESSURE: 84 MMHG | HEART RATE: 79 BPM | SYSTOLIC BLOOD PRESSURE: 124 MMHG | TEMPERATURE: 97.6 F | WEIGHT: 143.13 LBS

## 2024-08-12 VITALS — SYSTOLIC BLOOD PRESSURE: 116 MMHG | DIASTOLIC BLOOD PRESSURE: 68 MMHG

## 2024-08-12 DIAGNOSIS — Z13.228 ENCOUNTER FOR SCREENING FOR OTHER METABOLIC DISORDERS: ICD-10-CM

## 2024-08-12 DIAGNOSIS — M25.551 PAIN IN RIGHT HIP: ICD-10-CM

## 2024-08-12 DIAGNOSIS — R05.9 COUGH, UNSPECIFIED: ICD-10-CM

## 2024-08-12 DIAGNOSIS — J44.9 CHRONIC OBSTRUCTIVE PULMONARY DISEASE, UNSPECIFIED: ICD-10-CM

## 2024-08-12 DIAGNOSIS — I10 ESSENTIAL (PRIMARY) HYPERTENSION: ICD-10-CM

## 2024-08-12 DIAGNOSIS — R42 DIZZINESS AND GIDDINESS: ICD-10-CM

## 2024-08-12 DIAGNOSIS — E55.9 VITAMIN D DEFICIENCY, UNSPECIFIED: ICD-10-CM

## 2024-08-12 DIAGNOSIS — D64.9 ANEMIA, UNSPECIFIED: ICD-10-CM

## 2024-08-12 DIAGNOSIS — C61 MALIGNANT NEOPLASM OF PROSTATE: ICD-10-CM

## 2024-08-12 PROCEDURE — 93000 ELECTROCARDIOGRAM COMPLETE: CPT

## 2024-08-12 PROCEDURE — 99214 OFFICE O/P EST MOD 30 MIN: CPT

## 2024-08-12 PROCEDURE — G2211 COMPLEX E/M VISIT ADD ON: CPT

## 2024-08-12 NOTE — HISTORY OF PRESENT ILLNESS
[FreeTextEntry1] : This is an 78 y/o male with a pmhx of anemia, HTN, prostate CA  s/p radiation s/p seeds who presents to the office for follow up.  Patient reports pain in right hip. He noted dizziness when standing. Patient reports SOB at rest and with exertion. Patient was seen by pulm for SOB, noted with mild emphysema on CT chest 4/2024, PFT's with mild restrictive pattern, started on inhalers.  Patient denies chest pain, palpitations, syncope, changes in bowel/bladder habits or appetite.

## 2024-08-13 LAB
25(OH)D3 SERPL-MCNC: 30.3 NG/ML
ALBUMIN SERPL ELPH-MCNC: 4.2 G/DL
ALP BLD-CCNC: 163 U/L
ALT SERPL-CCNC: 11 U/L
ANION GAP SERPL CALC-SCNC: 14 MMOL/L
AST SERPL-CCNC: 19 U/L
BASOPHILS # BLD AUTO: 0.06 K/UL
BASOPHILS NFR BLD AUTO: 0.8 %
BILIRUB DIRECT SERPL-MCNC: 0.2 MG/DL
BILIRUB INDIRECT SERPL-MCNC: 0.3 MG/DL
BILIRUB SERPL-MCNC: 0.5 MG/DL
BUN SERPL-MCNC: 16 MG/DL
CALCIUM SERPL-MCNC: 9 MG/DL
CHLORIDE SERPL-SCNC: 102 MMOL/L
CHOLEST SERPL-MCNC: 168 MG/DL
CO2 SERPL-SCNC: 20 MMOL/L
CREAT SERPL-MCNC: 1.28 MG/DL
EGFR: 57 ML/MIN/1.73M2
EOSINOPHIL # BLD AUTO: 0.18 K/UL
EOSINOPHIL NFR BLD AUTO: 2.5 %
ESTIMATED AVERAGE GLUCOSE: 108 MG/DL
FERRITIN SERPL-MCNC: 124 NG/ML
FOLATE SERPL-MCNC: 6.3 NG/ML
GLUCOSE SERPL-MCNC: 88 MG/DL
HBA1C MFR BLD HPLC: 5.4 %
HCT VFR BLD CALC: 43.6 %
HDLC SERPL-MCNC: 71 MG/DL
HGB BLD-MCNC: 14 G/DL
IMM GRANULOCYTES NFR BLD AUTO: 0.6 %
IRON SATN MFR SERPL: 34 %
IRON SERPL-MCNC: 84 UG/DL
LDLC SERPL CALC-MCNC: 84 MG/DL
LYMPHOCYTES # BLD AUTO: 1.08 K/UL
LYMPHOCYTES NFR BLD AUTO: 15.2 %
MAN DIFF?: NORMAL
MCHC RBC-ENTMCNC: 29.7 PG
MCHC RBC-ENTMCNC: 32.1 GM/DL
MCV RBC AUTO: 92.6 FL
MONOCYTES # BLD AUTO: 0.92 K/UL
MONOCYTES NFR BLD AUTO: 12.9 %
NEUTROPHILS # BLD AUTO: 4.83 K/UL
NEUTROPHILS NFR BLD AUTO: 68 %
NONHDLC SERPL-MCNC: 96 MG/DL
PLATELET # BLD AUTO: 220 K/UL
POTASSIUM SERPL-SCNC: 5.2 MMOL/L
PROT SERPL-MCNC: 6.8 G/DL
RBC # BLD: 4.71 M/UL
RBC # FLD: 14.8 %
SODIUM SERPL-SCNC: 136 MMOL/L
T4 FREE SERPL-MCNC: 1.2 NG/DL
TIBC SERPL-MCNC: 247 UG/DL
TRIGL SERPL-MCNC: 63 MG/DL
TSH SERPL-ACNC: 0.45 UIU/ML
UIBC SERPL-MCNC: 163 UG/DL
VIT B12 SERPL-MCNC: 1849 PG/ML
WBC # FLD AUTO: 7.11 K/UL

## 2024-08-21 ENCOUNTER — APPOINTMENT (OUTPATIENT)
Dept: CARDIOLOGY | Facility: CLINIC | Age: 79
End: 2024-08-21
Payer: MEDICARE

## 2024-08-21 DIAGNOSIS — R06.09 OTHER FORMS OF DYSPNEA: ICD-10-CM

## 2024-08-21 PROCEDURE — 78452 HT MUSCLE IMAGE SPECT MULT: CPT

## 2024-08-21 PROCEDURE — A9500: CPT

## 2024-08-21 PROCEDURE — 93015 CV STRESS TEST SUPVJ I&R: CPT

## 2024-08-26 DIAGNOSIS — M25.551 PAIN IN RIGHT HIP: ICD-10-CM

## 2024-08-28 ENCOUNTER — APPOINTMENT (OUTPATIENT)
Dept: ORTHOPEDIC SURGERY | Facility: CLINIC | Age: 79
End: 2024-08-28

## 2024-09-10 ENCOUNTER — TRANSCRIPTION ENCOUNTER (OUTPATIENT)
Age: 79
End: 2024-09-10

## 2024-09-11 ENCOUNTER — APPOINTMENT (OUTPATIENT)
Dept: PULMONOLOGY | Facility: CLINIC | Age: 79
End: 2024-09-11

## 2024-09-17 ENCOUNTER — APPOINTMENT (OUTPATIENT)
Dept: ORTHOPEDIC SURGERY | Facility: CLINIC | Age: 79
End: 2024-09-17
Payer: MEDICARE

## 2024-09-17 VITALS — HEIGHT: 68 IN | BODY MASS INDEX: 21.67 KG/M2 | WEIGHT: 143 LBS

## 2024-09-17 DIAGNOSIS — M25.552 PAIN IN LEFT HIP: ICD-10-CM

## 2024-09-17 DIAGNOSIS — M16.0 BILATERAL PRIMARY OSTEOARTHRITIS OF HIP: ICD-10-CM

## 2024-09-17 DIAGNOSIS — M25.551 PAIN IN RIGHT HIP: ICD-10-CM

## 2024-09-17 PROCEDURE — 99204 OFFICE O/P NEW MOD 45 MIN: CPT

## 2024-09-17 PROCEDURE — 73503 X-RAY EXAM HIP UNI 4/> VIEWS: CPT | Mod: RT

## 2024-09-17 NOTE — ASSESSMENT
[FreeTextEntry1] : 78 yo male presenting today with mild bilateral hip oa. Patient has no pain and no complaints today. X-rays with mild bilateral hip oa without abnormalities. Recommend non-surgical management. -Discussed with patient that he should return to the office if his pain recurs, understanding expressed -Activities as tolerated, no restrictions -Rest, ice, compression, elevation, NSAIDs PRN for pain.  -All questions answered -F/u PRN  The diagnosis was explained in detail. The potential non-surgical and surgical treatments were reviewed. The relative risks and benefits of each option were considered relative to the patients age, activity level, medical history, symptom severity and previously attempted treatments.  The patient was advised to consult with their primary medical provider prior to initiation of any new medications to reduce the risk of adverse effects specific to their long-term home medications and medical history. The risk of gastrointestinal irritation and kidney injury specific to long-term NSAID use was discussed.  Entered by Jeffery Vu PA-C acting as scribe. Dr. Fisher Attestation The documentation recorded by the scribe, in my presence, accurately reflects the service I, Dr. Fisher, personally performed, and the decisions made by me with my edits as appropriate.

## 2024-09-17 NOTE — PHYSICAL EXAM
[de-identified] : Examination of bilateral hips is as follows:  INSPECTION: no swelling, no ecchymosis, no erythema, no scars, no palpable masses.  PALPATION: no ttp over groin ROM: flexion 120 degrees, extension 30 degrees, adduction 35 degrees, abduction 45 degrees, external rotation 45 degrees, internal rotation 45 degrees.  STRENGTH: flexion 5/5, extension 5/5, abduction 5/5, adduction 5/5.  VASCULAR: dorsalis pedis pulse: 2+, posterior tibialis pulse: 2+.  NEURO: motor exam 5/5 throughout, light touch intact throughout, no focal motor deficits.  GAIT: non-antalgic, patient ambulates without assistive device.  X-rays of the left hip is as follows: Hip with pelvis 2 view in the anteroposterior, lateral views: Mild hip oa (Tonnis Grade I). There are no fractures, subluxations or dislocations. No significant abnormalities are seen.

## 2024-09-17 NOTE — HISTORY OF PRESENT ILLNESS
[Sudden] : sudden [Rest] : rest [Retired] : Work status: retired [0] : 0 [de-identified] : Patient here for left hip/pelvis. Patient states NKI. Patient states pain started 3 weeks ago and started during shingles outbreak. Patient states he has no pain and no complaints at this time.  [] : Post Surgical Visit: no [FreeTextEntry1] : Left Pelvis  [FreeTextEntry3] : 3 weeks ago  [FreeTextEntry5] : NKI

## 2024-09-26 ENCOUNTER — APPOINTMENT (OUTPATIENT)
Dept: PULMONOLOGY | Facility: CLINIC | Age: 79
End: 2024-09-26
Payer: MEDICARE

## 2024-09-26 VITALS — HEART RATE: 71 BPM | OXYGEN SATURATION: 96 % | DIASTOLIC BLOOD PRESSURE: 84 MMHG | SYSTOLIC BLOOD PRESSURE: 145 MMHG

## 2024-09-26 DIAGNOSIS — Z77.090 CONTACT WITH AND (SUSPECTED) EXPOSURE TO ASBESTOS: ICD-10-CM

## 2024-09-26 DIAGNOSIS — J43.8 OTHER EMPHYSEMA: ICD-10-CM

## 2024-09-26 DIAGNOSIS — J44.9 CHRONIC OBSTRUCTIVE PULMONARY DISEASE, UNSPECIFIED: ICD-10-CM

## 2024-09-26 DIAGNOSIS — Z23 ENCOUNTER FOR IMMUNIZATION: ICD-10-CM

## 2024-09-26 PROCEDURE — 90662 IIV NO PRSV INCREASED AG IM: CPT

## 2024-09-26 PROCEDURE — 94727 GAS DIL/WSHOT DETER LNG VOL: CPT

## 2024-09-26 PROCEDURE — 99214 OFFICE O/P EST MOD 30 MIN: CPT | Mod: 25

## 2024-09-26 PROCEDURE — 94010 BREATHING CAPACITY TEST: CPT

## 2024-09-26 PROCEDURE — 99406 BEHAV CHNG SMOKING 3-10 MIN: CPT | Mod: 25

## 2024-09-26 PROCEDURE — G0008: CPT

## 2024-09-26 PROCEDURE — 94729 DIFFUSING CAPACITY: CPT

## 2024-09-26 NOTE — PROCEDURE
[FreeTextEntry1] : CT CHEST  - ORDERED BY: YANN BOLANOS PROCEDURE DATE:  04/17/2024 INTERPRETATION:  CLINICAL INFORMATION: cough COMPARISON: CT chest 4/26/2023 CONTRAST/COMPLICATIONS: IV Contrast: None Oral Contrast: None Complications: None PROCEDURE: CT of the Chest was performed. Sagittal and coronal reformats were performed. FINDINGS: LUNGS AND AIRWAYS: Patent central airways. No bronchial thickening or bronchiectasis. Mild paraseptal emphysema. Mild linear atelectasis within the lung bases. The lungs are otherwise clear. PLEURA: No pleural effusion. Stable mild posterior left upper pleural calcification. MEDIASTINUM AND BHAVYA: No lymphadenopathy. VESSELS: Vascular calcifications of the coronary arteries and aorta. HEART: Heart size is normal. Trace fluid along the superior pericardial recess. CHEST WALL AND LOWER NECK: Within normal limits. VISUALIZED UPPER ABDOMEN: Within normal limits. BONES: Mild degenerative changes IMPRESSION: No acute pulmonary process. Mild emphysema.  PFT 9/26/24  mild  reduction at flow  rfates  Lung Volumes WNL  DLCO 80 % WNL HGB 14.0  LUIS No BD 4/22/24  mild  reduction flow rates interval improvement   PFT 2/28/24  moderate reduction flow  rates  TLC 70 % pred  no  air trapping  DLCO 59 % with mod loss fx  alveolar  capillary units HGB 13.4 pos  decline  correlate with pending CT CHEST  Spirometry no bronchodilator January 8 2024 Mild reduction flow rates No decline compared to prior data  Chest x-ray almost 1 year follow-up January 8, 2024 Cardiac size is normal Lung fields are clear No parenchymal infiltrates pleural effusions with dominant pulmonary nodules Cannot exclude a plaque of the right hemidiaphragm but no other obvious identified pleural plaque noted in a patient with known   Spirometry no bronchodilator November 20, 2023 Mild reduction in flow rates Compared to September 18, 2023 there is a greater than 200 cc decline at the FVC  Pulmonary 6-minute walk exercise study November 20, 2023 Baseline O2 saturation 96% There is no significant andrew desaturation on room air Lowest O2 saturation 93% with good recovery Impression normal study   PFT September 18, 2023 Mild reduction flow rates Ratio 79 TLC 77% predicted Diffusion 70% predicted Overall very mild obstructive restrictive ventilatory impairment Significant interval improvement of flow rates without decline at diffusion or TLC   [place] 6/26/23 moderate reduction flow  rates  pos  decline  PFT Feder[place] 15, 2023 Mild restrictive ventilatory impairment TLC 75% predicted Positive air-trapping with RV/TLC ratio 130% predicted Diffusion 63% predicted with a mild to moderate loss of functioning alveolar Units Hemoglobin 13.2  CT chest April 26, 2023 Predominantly left-sided calcified pleural plaque Bilateral lower lobe linear atelectatic scar No dominant pulmonary nodules Bhavya mediastinum unremarkable No bony lesions Impression calcified pleural plaque Clinical correlation  Pulmonary 6 normal exercise study Feder[place] 15, 2023 Baseline O2 saturation 97% Impression normal study No room air desaturation  HD Flu IM 9/26/24

## 2024-09-26 NOTE — DISCUSSION/SUMMARY
[FreeTextEntry1] : NOTED  CT CHEST reviewed  paraseptal Emphysema Interval improvement at flow rates Mild restrictive ventilatory impairment Calcified pleural plaques suggestive of prior asbestos exposure No clinical findings to suggest interstitial lung disease although asbestos related lung disease can account for the restrictive component Component of obstructive airway disease with demonstrated air trapping on the PFT data Recommendations Former extensive tobacco history with pleural plaques suggestive of prior asbestos exposure makes this patient higher risk for lung cancer Therefore yearly chest CT scan recommended For the cough occasional wheeze and obstructive airway disease Sample Anoro Ellipta trial 1 puff daily with detailed instructions provided- Change to Trelegy if covered As needed albuterol with MDI instructions provided declines Flu  vaccine  RSV addressed Newest COVID  vaccine  addressed Office follow-up several weeks to see response to therapy Recommend continue controller therapy Recommended to follow-up with cardiology primary care physician regarding any treatment for erectile dysfunction Follow-up primary care physician 3-7 minute discussion with patient about smoking cessation was initiated with patient showing interest in attempting to quit smoking. Problems with risks of continued tobacco smoking including respiratory, cardiovascular, and oncological problems were discussed. Advice on smoking cessation was reiterated including methods of quitting, setting a date to quit, and different medicines and support systems available for smoking cessation was discussed. Addressed  options including nicotine patches gums lozenges, Wellbutrin, Chantix as well as smoking cessation program.

## 2024-09-26 NOTE — REASON FOR VISIT
[Follow-Up] : a follow-up visit [Abnormal CXR/ Chest CT] : an abnormal CXR/ chest CT [COPD] : COPD [Emphysema] : emphysema [TextBox_44] : Pleural plaque , exertional dyspnea

## 2024-10-24 ENCOUNTER — RX RENEWAL (OUTPATIENT)
Age: 79
End: 2024-10-24

## 2024-11-01 ENCOUNTER — RX RENEWAL (OUTPATIENT)
Age: 79
End: 2024-11-01

## 2024-12-30 ENCOUNTER — APPOINTMENT (OUTPATIENT)
Dept: PULMONOLOGY | Facility: CLINIC | Age: 79
End: 2024-12-30
Payer: MEDICARE

## 2024-12-30 VITALS — HEART RATE: 102 BPM | DIASTOLIC BLOOD PRESSURE: 62 MMHG | OXYGEN SATURATION: 98 % | SYSTOLIC BLOOD PRESSURE: 96 MMHG

## 2024-12-30 DIAGNOSIS — Z23 ENCOUNTER FOR IMMUNIZATION: ICD-10-CM

## 2024-12-30 DIAGNOSIS — J44.9 CHRONIC OBSTRUCTIVE PULMONARY DISEASE, UNSPECIFIED: ICD-10-CM

## 2024-12-30 DIAGNOSIS — R06.09 OTHER FORMS OF DYSPNEA: ICD-10-CM

## 2024-12-30 PROCEDURE — 94010 BREATHING CAPACITY TEST: CPT

## 2024-12-30 PROCEDURE — 99214 OFFICE O/P EST MOD 30 MIN: CPT | Mod: 25

## 2024-12-30 PROCEDURE — G0009: CPT

## 2024-12-30 PROCEDURE — 94618 PULMONARY STRESS TESTING: CPT

## 2024-12-30 PROCEDURE — 90677 PCV20 VACCINE IM: CPT

## 2024-12-30 RX ORDER — CARBIDOPA AND LEVODOPA 25; 100 MG/1; MG/1
25-100 TABLET ORAL
Qty: 270 | Refills: 0 | Status: ACTIVE | COMMUNITY
Start: 2024-12-17

## 2025-01-20 ENCOUNTER — RX RENEWAL (OUTPATIENT)
Age: 80
End: 2025-01-20

## 2025-01-30 ENCOUNTER — RX RENEWAL (OUTPATIENT)
Age: 80
End: 2025-01-30

## 2025-02-18 ENCOUNTER — RX RENEWAL (OUTPATIENT)
Age: 80
End: 2025-02-18

## 2025-02-24 ENCOUNTER — APPOINTMENT (OUTPATIENT)
Dept: CARDIOLOGY | Facility: CLINIC | Age: 80
End: 2025-02-24
Payer: MEDICARE

## 2025-02-24 VITALS
HEIGHT: 68 IN | HEART RATE: 98 BPM | OXYGEN SATURATION: 98 % | BODY MASS INDEX: 21.82 KG/M2 | DIASTOLIC BLOOD PRESSURE: 70 MMHG | WEIGHT: 144 LBS | SYSTOLIC BLOOD PRESSURE: 110 MMHG | TEMPERATURE: 97.7 F

## 2025-02-24 DIAGNOSIS — I10 ESSENTIAL (PRIMARY) HYPERTENSION: ICD-10-CM

## 2025-02-24 DIAGNOSIS — R06.09 OTHER FORMS OF DYSPNEA: ICD-10-CM

## 2025-02-24 DIAGNOSIS — D64.9 ANEMIA, UNSPECIFIED: ICD-10-CM

## 2025-02-24 DIAGNOSIS — E55.9 VITAMIN D DEFICIENCY, UNSPECIFIED: ICD-10-CM

## 2025-02-24 DIAGNOSIS — R05.9 COUGH, UNSPECIFIED: ICD-10-CM

## 2025-02-24 DIAGNOSIS — J44.9 CHRONIC OBSTRUCTIVE PULMONARY DISEASE, UNSPECIFIED: ICD-10-CM

## 2025-02-24 DIAGNOSIS — Z13.228 ENCOUNTER FOR SCREENING FOR OTHER METABOLIC DISORDERS: ICD-10-CM

## 2025-02-24 DIAGNOSIS — M25.551 PAIN IN RIGHT HIP: ICD-10-CM

## 2025-02-24 DIAGNOSIS — C61 MALIGNANT NEOPLASM OF PROSTATE: ICD-10-CM

## 2025-02-24 PROCEDURE — 93000 ELECTROCARDIOGRAM COMPLETE: CPT

## 2025-02-24 PROCEDURE — 99214 OFFICE O/P EST MOD 30 MIN: CPT | Mod: 25

## 2025-02-25 LAB
25(OH)D3 SERPL-MCNC: 20.3 NG/ML
ANION GAP SERPL CALC-SCNC: 12 MMOL/L
APPEARANCE: CLEAR
BACTERIA: NEGATIVE /HPF
BASOPHILS # BLD AUTO: 0.05 K/UL
BASOPHILS NFR BLD AUTO: 0.7 %
BILIRUBIN URINE: NEGATIVE
BLOOD URINE: NEGATIVE
BUN SERPL-MCNC: 16 MG/DL
CALCIUM SERPL-MCNC: 9.3 MG/DL
CAST: 0 /LPF
CHLORIDE SERPL-SCNC: 104 MMOL/L
CHOLEST SERPL-MCNC: 170 MG/DL
CO2 SERPL-SCNC: 23 MMOL/L
COLOR: YELLOW
CREAT SERPL-MCNC: 1.13 MG/DL
EGFR: 66 ML/MIN/1.73M2
EOSINOPHIL # BLD AUTO: 0.08 K/UL
EOSINOPHIL NFR BLD AUTO: 1.1 %
EPITHELIAL CELLS: 1 /HPF
ESTIMATED AVERAGE GLUCOSE: 111 MG/DL
FERRITIN SERPL-MCNC: 189 NG/ML
FOLATE SERPL-MCNC: 9.7 NG/ML
GLUCOSE QUALITATIVE U: NEGATIVE MG/DL
GLUCOSE SERPL-MCNC: 93 MG/DL
HAPTOGLOB SERPL-MCNC: 194 MG/DL
HBA1C MFR BLD HPLC: 5.5 %
HCT VFR BLD CALC: 44.3 %
HDLC SERPL-MCNC: 73 MG/DL
HGB BLD-MCNC: 13.9 G/DL
IMM GRANULOCYTES NFR BLD AUTO: 0.3 %
IRON SATN MFR SERPL: 31 %
IRON SERPL-MCNC: 68 UG/DL
KETONES URINE: NEGATIVE MG/DL
LDH SERPL-CCNC: 108 U/L
LDLC SERPL CALC-MCNC: 77 MG/DL
LEUKOCYTE ESTERASE URINE: NEGATIVE
LYMPHOCYTES # BLD AUTO: 1.13 K/UL
LYMPHOCYTES NFR BLD AUTO: 16 %
MAGNESIUM SERPL-MCNC: 2.3 MG/DL
MAN DIFF?: NORMAL
MCHC RBC-ENTMCNC: 28.3 PG
MCHC RBC-ENTMCNC: 31.4 G/DL
MCV RBC AUTO: 90 FL
MICROSCOPIC-UA: NORMAL
MONOCYTES # BLD AUTO: 0.83 K/UL
MONOCYTES NFR BLD AUTO: 11.8 %
NEUTROPHILS # BLD AUTO: 4.94 K/UL
NEUTROPHILS NFR BLD AUTO: 70.1 %
NITRITE URINE: NEGATIVE
NONHDLC SERPL-MCNC: 97 MG/DL
PH URINE: 6
PLATELET # BLD AUTO: 246 K/UL
POTASSIUM SERPL-SCNC: 4.7 MMOL/L
PROTEIN URINE: NORMAL MG/DL
PSA SERPL-MCNC: 0.05 NG/ML
RBC # BLD: 4.92 M/UL
RBC # FLD: 16.5 %
RED BLOOD CELLS URINE: 3 /HPF
SODIUM SERPL-SCNC: 139 MMOL/L
SPECIFIC GRAVITY URINE: 1.02
T4 FREE SERPL-MCNC: 1.2 NG/DL
TIBC SERPL-MCNC: 219 UG/DL
TRANSFERRIN SERPL-MCNC: 176 MG/DL
TRIGL SERPL-MCNC: 116 MG/DL
TSH SERPL-ACNC: 0.33 UIU/ML
UIBC SERPL-MCNC: 151 UG/DL
UROBILINOGEN URINE: 1 MG/DL
VIT B12 SERPL-MCNC: >2000 PG/ML
WBC # FLD AUTO: 7.05 K/UL
WHITE BLOOD CELLS URINE: 0 /HPF

## 2025-03-08 ENCOUNTER — RX RENEWAL (OUTPATIENT)
Age: 80
End: 2025-03-08

## 2025-03-26 ENCOUNTER — APPOINTMENT (OUTPATIENT)
Dept: PULMONOLOGY | Facility: CLINIC | Age: 80
End: 2025-03-26
Payer: MEDICARE

## 2025-03-26 VITALS — DIASTOLIC BLOOD PRESSURE: 72 MMHG | SYSTOLIC BLOOD PRESSURE: 110 MMHG | HEART RATE: 87 BPM | OXYGEN SATURATION: 98 %

## 2025-03-26 DIAGNOSIS — J44.9 CHRONIC OBSTRUCTIVE PULMONARY DISEASE, UNSPECIFIED: ICD-10-CM

## 2025-03-26 DIAGNOSIS — R94.2 ABNORMAL RESULTS OF PULMONARY FUNCTION STUDIES: ICD-10-CM

## 2025-03-26 DIAGNOSIS — Z77.090 CONTACT WITH AND (SUSPECTED) EXPOSURE TO ASBESTOS: ICD-10-CM

## 2025-03-26 DIAGNOSIS — J43.8 OTHER EMPHYSEMA: ICD-10-CM

## 2025-03-26 PROCEDURE — 99214 OFFICE O/P EST MOD 30 MIN: CPT | Mod: 25

## 2025-03-26 PROCEDURE — ZZZZZ: CPT

## 2025-03-26 PROCEDURE — 94727 GAS DIL/WSHOT DETER LNG VOL: CPT

## 2025-03-26 PROCEDURE — 94010 BREATHING CAPACITY TEST: CPT

## 2025-03-26 PROCEDURE — 71046 X-RAY EXAM CHEST 2 VIEWS: CPT

## 2025-03-26 PROCEDURE — 94729 DIFFUSING CAPACITY: CPT

## 2025-04-04 ENCOUNTER — OUTPATIENT (OUTPATIENT)
Dept: OUTPATIENT SERVICES | Facility: HOSPITAL | Age: 80
LOS: 1 days | End: 2025-04-04
Payer: MEDICARE

## 2025-04-04 ENCOUNTER — OUTPATIENT (OUTPATIENT)
Dept: OUTPATIENT SERVICES | Facility: HOSPITAL | Age: 80
LOS: 1 days | End: 2025-04-04

## 2025-04-04 ENCOUNTER — APPOINTMENT (OUTPATIENT)
Dept: CT IMAGING | Facility: CLINIC | Age: 80
End: 2025-04-04

## 2025-04-04 DIAGNOSIS — R97.20 ELEVATED PROSTATE SPECIFIC ANTIGEN [PSA]: Chronic | ICD-10-CM

## 2025-04-04 DIAGNOSIS — Z98.890 OTHER SPECIFIED POSTPROCEDURAL STATES: Chronic | ICD-10-CM

## 2025-04-04 DIAGNOSIS — Z00.8 ENCOUNTER FOR OTHER GENERAL EXAMINATION: ICD-10-CM

## 2025-04-04 DIAGNOSIS — Z77.090 CONTACT WITH AND (SUSPECTED) EXPOSURE TO ASBESTOS: ICD-10-CM

## 2025-04-04 PROCEDURE — 71250 CT THORAX DX C-: CPT | Mod: 26

## 2025-04-04 PROCEDURE — 71250 CT THORAX DX C-: CPT

## 2025-04-10 ENCOUNTER — NON-APPOINTMENT (OUTPATIENT)
Age: 80
End: 2025-04-10

## 2025-04-29 ENCOUNTER — APPOINTMENT (OUTPATIENT)
Dept: CARDIOLOGY | Facility: CLINIC | Age: 80
End: 2025-04-29
Payer: MEDICARE

## 2025-04-29 VITALS
WEIGHT: 146 LBS | HEART RATE: 89 BPM | TEMPERATURE: 98 F | HEIGHT: 68 IN | OXYGEN SATURATION: 99 % | SYSTOLIC BLOOD PRESSURE: 120 MMHG | BODY MASS INDEX: 22.13 KG/M2 | DIASTOLIC BLOOD PRESSURE: 80 MMHG

## 2025-04-29 DIAGNOSIS — M25.551 PAIN IN RIGHT HIP: ICD-10-CM

## 2025-04-29 DIAGNOSIS — D64.9 ANEMIA, UNSPECIFIED: ICD-10-CM

## 2025-04-29 DIAGNOSIS — R42 DIZZINESS AND GIDDINESS: ICD-10-CM

## 2025-04-29 DIAGNOSIS — I10 ESSENTIAL (PRIMARY) HYPERTENSION: ICD-10-CM

## 2025-04-29 DIAGNOSIS — R06.09 OTHER FORMS OF DYSPNEA: ICD-10-CM

## 2025-04-29 DIAGNOSIS — G20.A1 PARKINSON'S DISEASE WITHOUT DYSKINESIA, WITHOUT MENTION OF FLUCTUATIONS: ICD-10-CM

## 2025-04-29 DIAGNOSIS — C61 MALIGNANT NEOPLASM OF PROSTATE: ICD-10-CM

## 2025-04-29 DIAGNOSIS — N52.9 MALE ERECTILE DYSFUNCTION, UNSPECIFIED: ICD-10-CM

## 2025-04-29 DIAGNOSIS — E55.9 VITAMIN D DEFICIENCY, UNSPECIFIED: ICD-10-CM

## 2025-04-29 DIAGNOSIS — J44.9 CHRONIC OBSTRUCTIVE PULMONARY DISEASE, UNSPECIFIED: ICD-10-CM

## 2025-04-29 DIAGNOSIS — R74.8 ABNORMAL LEVELS OF OTHER SERUM ENZYMES: ICD-10-CM

## 2025-04-29 DIAGNOSIS — R53.83 OTHER FATIGUE: ICD-10-CM

## 2025-04-29 PROCEDURE — G2211 COMPLEX E/M VISIT ADD ON: CPT

## 2025-04-29 PROCEDURE — 93000 ELECTROCARDIOGRAM COMPLETE: CPT

## 2025-04-29 PROCEDURE — 99214 OFFICE O/P EST MOD 30 MIN: CPT

## 2025-04-30 ENCOUNTER — RESULT REVIEW (OUTPATIENT)
Age: 80
End: 2025-04-30

## 2025-04-30 LAB
ALBUMIN SERPL ELPH-MCNC: 4.4 G/DL
ALP BLD-CCNC: 142 U/L
ALT SERPL-CCNC: 14 U/L
AST SERPL-CCNC: 26 U/L
BILIRUB DIRECT SERPL-MCNC: 0.1 MG/DL
BILIRUB INDIRECT SERPL-MCNC: 0.2 MG/DL
BILIRUB SERPL-MCNC: 0.4 MG/DL
GGT SERPL-CCNC: 27 U/L
PROT SERPL-MCNC: 7.4 G/DL

## 2025-05-05 LAB
ANA SER IF-ACNC: NEGATIVE
MITOCHONDRIA AB SER IF-ACNC: NORMAL
SMOOTH MUSCLE AB SER QL IF: NORMAL

## 2025-05-06 ENCOUNTER — NON-APPOINTMENT (OUTPATIENT)
Age: 80
End: 2025-05-06

## 2025-05-06 LAB — 5NT SERPL-CCNC: 3 IU/L

## 2025-06-25 ENCOUNTER — APPOINTMENT (OUTPATIENT)
Dept: PULMONOLOGY | Facility: CLINIC | Age: 80
End: 2025-06-25
Payer: MEDICARE

## 2025-06-25 VITALS — HEART RATE: 83 BPM | SYSTOLIC BLOOD PRESSURE: 138 MMHG | OXYGEN SATURATION: 98 % | DIASTOLIC BLOOD PRESSURE: 83 MMHG

## 2025-06-25 PROCEDURE — 95012 NITRIC OXIDE EXP GAS DETER: CPT

## 2025-06-25 PROCEDURE — 94729 DIFFUSING CAPACITY: CPT

## 2025-06-25 PROCEDURE — 99214 OFFICE O/P EST MOD 30 MIN: CPT | Mod: 25

## 2025-06-25 PROCEDURE — ZZZZZ: CPT

## 2025-06-25 PROCEDURE — 94060 EVALUATION OF WHEEZING: CPT

## 2025-06-25 PROCEDURE — 94727 GAS DIL/WSHOT DETER LNG VOL: CPT

## 2025-06-25 RX ORDER — FLUTICASONE FUROATE, UMECLIDINIUM BROMIDE AND VILANTEROL TRIFENATATE 200; 62.5; 25 UG/1; UG/1; UG/1
200-62.5-25 POWDER RESPIRATORY (INHALATION)
Qty: 1 | Refills: 3 | Status: ACTIVE | COMMUNITY
Start: 2025-06-25 | End: 1900-01-01

## 2025-07-15 ENCOUNTER — RX RENEWAL (OUTPATIENT)
Age: 80
End: 2025-07-15

## 2025-08-27 ENCOUNTER — APPOINTMENT (OUTPATIENT)
Dept: CARDIOLOGY | Facility: CLINIC | Age: 80
End: 2025-08-27

## (undated) DEVICE — DRAPE C ARM BAND BAG

## (undated) DEVICE — GRID BRACHYTHERAPY EZ 18G

## (undated) DEVICE — DRAPE BACK TABLE COVER 44X90"

## (undated) DEVICE — SYR LUER LOK 10CC

## (undated) DEVICE — BASIN SET DOUBLE

## (undated) DEVICE — GOWN XL

## (undated) DEVICE — WARMING BLANKET UPPER ADULT

## (undated) DEVICE — Device

## (undated) DEVICE — GLV 7.5 PROTEXIS (WHITE)

## (undated) DEVICE — STOPCOCK 3 WAY

## (undated) DEVICE — FOLEY CATH 2-WAY 16FR 5CC LATEX LUBRICATH

## (undated) DEVICE — SOL IRR BAG H2O 3000ML

## (undated) DEVICE — VENODYNE/SCD SLEEVE CALF MEDIUM

## (undated) DEVICE — SYR LUER LOK 50CC

## (undated) DEVICE — PREP BETADINE SPONGE STICKS

## (undated) DEVICE — BALLOON ENDOCAVITY 2X14CM